# Patient Record
Sex: MALE | Race: WHITE | NOT HISPANIC OR LATINO | Employment: OTHER | URBAN - METROPOLITAN AREA
[De-identification: names, ages, dates, MRNs, and addresses within clinical notes are randomized per-mention and may not be internally consistent; named-entity substitution may affect disease eponyms.]

---

## 2023-08-16 LAB
BASOPHILS (10*3/UL) IN BLOOD BY AUTOMATED COUNT: 0.03 X10E9/L (ref 0–0.1)
BASOPHILS/100 LEUKOCYTES IN BLOOD BY AUTOMATED COUNT: 0.5 % (ref 0–2)
EOSINOPHILS (10*3/UL) IN BLOOD BY AUTOMATED COUNT: 0.09 X10E9/L (ref 0–0.7)
EOSINOPHILS/100 LEUKOCYTES IN BLOOD BY AUTOMATED COUNT: 1.5 % (ref 0–6)
ERYTHROCYTE DISTRIBUTION WIDTH (RATIO) BY AUTOMATED COUNT: 13.3 % (ref 11.5–14.5)
ERYTHROCYTE MEAN CORPUSCULAR HEMOGLOBIN CONCENTRATION (G/DL) BY AUTOMATED: 33.6 G/DL (ref 32–36)
ERYTHROCYTE MEAN CORPUSCULAR VOLUME (FL) BY AUTOMATED COUNT: 98 FL (ref 80–100)
ERYTHROCYTES (10*6/UL) IN BLOOD BY AUTOMATED COUNT: 5.09 X10E12/L (ref 4.5–5.9)
FERRITIN (UG/LL) IN SER/PLAS: 84 UG/L (ref 20–300)
HEMATOCRIT (%) IN BLOOD BY AUTOMATED COUNT: 49.7 % (ref 41–52)
HEMOGLOBIN (G/DL) IN BLOOD: 16.7 G/DL (ref 13.5–17.5)
IMMATURE GRANULOCYTES/100 LEUKOCYTES IN BLOOD BY AUTOMATED COUNT: 0.2 % (ref 0–0.9)
LEUKOCYTES (10*3/UL) IN BLOOD BY AUTOMATED COUNT: 5.8 X10E9/L (ref 4.4–11.3)
LYMPHOCYTES (10*3/UL) IN BLOOD BY AUTOMATED COUNT: 1.56 X10E9/L (ref 1.2–4.8)
LYMPHOCYTES/100 LEUKOCYTES IN BLOOD BY AUTOMATED COUNT: 26.9 % (ref 13–44)
MONOCYTES (10*3/UL) IN BLOOD BY AUTOMATED COUNT: 0.48 X10E9/L (ref 0.1–1)
MONOCYTES/100 LEUKOCYTES IN BLOOD BY AUTOMATED COUNT: 8.3 % (ref 2–10)
NEUTROPHILS (10*3/UL) IN BLOOD BY AUTOMATED COUNT: 3.64 X10E9/L (ref 1.2–7.7)
NEUTROPHILS/100 LEUKOCYTES IN BLOOD BY AUTOMATED COUNT: 62.6 % (ref 40–80)
PLATELETS (10*3/UL) IN BLOOD AUTOMATED COUNT: 196 X10E9/L (ref 150–450)

## 2023-09-12 PROBLEM — K74.60 CIRRHOSIS OF LIVER (MULTI): Status: ACTIVE | Noted: 2023-09-12

## 2023-09-12 PROBLEM — Z96.659 ARTIFICIAL KNEE JOINT PRESENT: Status: ACTIVE | Noted: 2023-09-12

## 2023-09-12 PROBLEM — I20.9 ANGINA PECTORIS (CMS-HCC): Status: ACTIVE | Noted: 2023-09-12

## 2023-09-12 PROBLEM — B19.20 HEPATITIS C VIRUS: Status: ACTIVE | Noted: 2023-09-12

## 2023-09-12 PROBLEM — E55.9 VITAMIN D DEFICIENCY: Status: ACTIVE | Noted: 2023-09-12

## 2023-09-12 PROBLEM — G47.00 INSOMNIA: Status: ACTIVE | Noted: 2023-09-12

## 2023-09-12 PROBLEM — N40.0 BENIGN PROSTATIC HYPERPLASIA: Status: ACTIVE | Noted: 2023-09-12

## 2023-09-12 PROBLEM — E83.119 HEMOCHROMATOSIS: Status: ACTIVE | Noted: 2023-09-12

## 2023-09-12 PROBLEM — E78.5 HYPERLIPIDEMIA: Status: ACTIVE | Noted: 2023-09-12

## 2023-09-12 PROBLEM — M17.9 OSTEOARTHRITIS OF KNEE: Status: ACTIVE | Noted: 2023-09-12

## 2023-09-12 PROBLEM — I10 ESSENTIAL HYPERTENSION: Status: ACTIVE | Noted: 2023-09-12

## 2023-09-12 PROBLEM — N52.9 ERECTILE DYSFUNCTION: Status: ACTIVE | Noted: 2023-09-12

## 2023-09-12 PROBLEM — I10 HIGH BLOOD PRESSURE: Status: ACTIVE | Noted: 2023-09-12

## 2023-09-12 PROBLEM — K42.9 UMBILICAL HERNIA: Status: ACTIVE | Noted: 2023-09-12

## 2023-09-12 PROBLEM — M19.90 OSTEOARTHRITIS: Status: ACTIVE | Noted: 2023-09-12

## 2023-09-12 RX ORDER — LISINOPRIL 20 MG/1
1 TABLET ORAL DAILY
COMMUNITY
Start: 2013-12-03 | End: 2024-02-02 | Stop reason: SDUPTHER

## 2023-09-12 RX ORDER — OXYCODONE AND ACETAMINOPHEN 5; 325 MG/1; MG/1
1 TABLET ORAL EVERY 4 HOURS PRN
COMMUNITY
End: 2023-10-16 | Stop reason: ALTCHOICE

## 2023-09-12 RX ORDER — BACLOFEN 10 MG/1
1 TABLET ORAL 3 TIMES DAILY PRN
COMMUNITY
End: 2023-10-16 | Stop reason: ALTCHOICE

## 2023-09-12 RX ORDER — POLYETHYLENE GLYCOL 3350 17 G/17G
POWDER, FOR SOLUTION ORAL DAILY PRN
COMMUNITY
End: 2023-10-16 | Stop reason: ALTCHOICE

## 2023-09-12 RX ORDER — CHOLECALCIFEROL (VITAMIN D3) 125 MCG
1 TABLET ORAL EVERY MORNING
COMMUNITY
Start: 2017-07-17 | End: 2023-10-16 | Stop reason: ALTCHOICE

## 2023-09-12 RX ORDER — LISINOPRIL 10 MG/1
1 TABLET ORAL DAILY
COMMUNITY
Start: 2014-12-16 | End: 2023-10-16 | Stop reason: ALTCHOICE

## 2023-09-12 RX ORDER — DOCUSATE SODIUM 100 MG/1
1 CAPSULE, LIQUID FILLED ORAL 2 TIMES DAILY
COMMUNITY
End: 2023-10-16 | Stop reason: ALTCHOICE

## 2023-10-13 DIAGNOSIS — E83.110 HEREDITARY HEMOCHROMATOSIS (CMS-HCC): Primary | ICD-10-CM

## 2023-10-13 RX ORDER — ALBUTEROL SULFATE 0.83 MG/ML
3 SOLUTION RESPIRATORY (INHALATION) AS NEEDED
Status: CANCELLED | OUTPATIENT
Start: 2023-10-16

## 2023-10-13 RX ORDER — DIPHENHYDRAMINE HYDROCHLORIDE 50 MG/ML
50 INJECTION INTRAMUSCULAR; INTRAVENOUS AS NEEDED
Status: CANCELLED | OUTPATIENT
Start: 2023-10-16

## 2023-10-13 RX ORDER — FAMOTIDINE 10 MG/ML
20 INJECTION INTRAVENOUS ONCE AS NEEDED
Status: CANCELLED | OUTPATIENT
Start: 2023-10-16

## 2023-10-13 RX ORDER — EPINEPHRINE 0.3 MG/.3ML
0.3 INJECTION SUBCUTANEOUS EVERY 5 MIN PRN
Status: CANCELLED | OUTPATIENT
Start: 2023-10-16

## 2023-10-16 ENCOUNTER — INFUSION (OUTPATIENT)
Dept: HEMATOLOGY/ONCOLOGY | Facility: CLINIC | Age: 68
End: 2023-10-16
Payer: MEDICARE

## 2023-10-16 ENCOUNTER — APPOINTMENT (OUTPATIENT)
Dept: LAB | Facility: CLINIC | Age: 68
End: 2023-10-16
Payer: MEDICARE

## 2023-10-16 VITALS
SYSTOLIC BLOOD PRESSURE: 132 MMHG | OXYGEN SATURATION: 97 % | BODY MASS INDEX: 30.02 KG/M2 | HEIGHT: 70 IN | HEART RATE: 74 BPM | TEMPERATURE: 97.9 F | RESPIRATION RATE: 18 BRPM | WEIGHT: 209.66 LBS | DIASTOLIC BLOOD PRESSURE: 86 MMHG

## 2023-10-16 DIAGNOSIS — E83.110 HEREDITARY HEMOCHROMATOSIS (CMS-HCC): ICD-10-CM

## 2023-10-16 LAB
ANION GAP SERPL CALC-SCNC: 13 MMOL/L (ref 10–20)
BASOPHILS # BLD AUTO: 0.05 X10*3/UL (ref 0–0.1)
BASOPHILS NFR BLD AUTO: 0.7 %
BUN SERPL-MCNC: 17 MG/DL (ref 6–23)
CALCIUM SERPL-MCNC: 9.9 MG/DL (ref 8.6–10.3)
CHLORIDE SERPL-SCNC: 103 MMOL/L (ref 98–107)
CO2 SERPL-SCNC: 27 MMOL/L (ref 21–32)
CREAT SERPL-MCNC: 1.13 MG/DL (ref 0.5–1.3)
EOSINOPHIL # BLD AUTO: 0.08 X10*3/UL (ref 0–0.7)
EOSINOPHIL NFR BLD AUTO: 1.1 %
ERYTHROCYTE [DISTWIDTH] IN BLOOD BY AUTOMATED COUNT: 12.9 % (ref 11.5–14.5)
FERRITIN SERPL-MCNC: 81 NG/ML (ref 20–300)
GFR SERPL CREATININE-BSD FRML MDRD: 71 ML/MIN/1.73M*2
GLUCOSE SERPL-MCNC: 80 MG/DL (ref 74–99)
HCT VFR BLD AUTO: 46.4 % (ref 41–52)
HGB BLD-MCNC: 16.2 G/DL (ref 13.5–17.5)
IMM GRANULOCYTES # BLD AUTO: 0.02 X10*3/UL (ref 0–0.7)
IMM GRANULOCYTES NFR BLD AUTO: 0.3 % (ref 0–0.9)
LYMPHOCYTES # BLD AUTO: 2.33 X10*3/UL (ref 1.2–4.8)
LYMPHOCYTES NFR BLD AUTO: 30.6 %
MCH RBC QN AUTO: 32.7 PG (ref 26–34)
MCHC RBC AUTO-ENTMCNC: 34.9 G/DL (ref 32–36)
MCV RBC AUTO: 94 FL (ref 80–100)
MONOCYTES # BLD AUTO: 0.49 X10*3/UL (ref 0.1–1)
MONOCYTES NFR BLD AUTO: 6.4 %
NEUTROPHILS # BLD AUTO: 4.64 X10*3/UL (ref 1.2–7.7)
NEUTROPHILS NFR BLD AUTO: 60.9 %
NRBC BLD-RTO: 0 /100 WBCS (ref 0–0)
PLATELET # BLD AUTO: 174 X10*3/UL (ref 150–450)
PMV BLD AUTO: 11.2 FL (ref 7.5–11.5)
POTASSIUM SERPL-SCNC: 4.3 MMOL/L (ref 3.5–5.3)
RBC # BLD AUTO: 4.96 X10*6/UL (ref 4.5–5.9)
SODIUM SERPL-SCNC: 139 MMOL/L (ref 136–145)
WBC # BLD AUTO: 7.6 X10*3/UL (ref 4.4–11.3)

## 2023-10-16 PROCEDURE — 84460 ALANINE AMINO (ALT) (SGPT): CPT | Mod: CMCLAB | Performed by: NURSE PRACTITIONER

## 2023-10-16 PROCEDURE — 99195 PHLEBOTOMY: CPT

## 2023-10-16 PROCEDURE — 36415 COLL VENOUS BLD VENIPUNCTURE: CPT

## 2023-10-16 PROCEDURE — 85025 COMPLETE CBC W/AUTO DIFF WBC: CPT | Performed by: NURSE PRACTITIONER

## 2023-10-16 PROCEDURE — 83540 ASSAY OF IRON: CPT | Performed by: NURSE PRACTITIONER

## 2023-10-16 PROCEDURE — 80053 COMPREHEN METABOLIC PANEL: CPT | Performed by: NURSE PRACTITIONER

## 2023-10-16 PROCEDURE — 82728 ASSAY OF FERRITIN: CPT | Mod: CMCLAB | Performed by: NURSE PRACTITIONER

## 2023-10-16 ASSESSMENT — PATIENT HEALTH QUESTIONNAIRE - PHQ9
2. FEELING DOWN, DEPRESSED OR HOPELESS: NOT AT ALL
1. LITTLE INTEREST OR PLEASURE IN DOING THINGS: NOT AT ALL
SUM OF ALL RESPONSES TO PHQ9 QUESTIONS 1 AND 2: 0

## 2023-10-16 ASSESSMENT — ENCOUNTER SYMPTOMS
DEPRESSION: 0
LOSS OF SENSATION IN FEET: 0
OCCASIONAL FEELINGS OF UNSTEADINESS: 0

## 2023-10-16 ASSESSMENT — COLUMBIA-SUICIDE SEVERITY RATING SCALE - C-SSRS
1. IN THE PAST MONTH, HAVE YOU WISHED YOU WERE DEAD OR WISHED YOU COULD GO TO SLEEP AND NOT WAKE UP?: NO
2. HAVE YOU ACTUALLY HAD ANY THOUGHTS OF KILLING YOURSELF?: NO
6. HAVE YOU EVER DONE ANYTHING, STARTED TO DO ANYTHING, OR PREPARED TO DO ANYTHING TO END YOUR LIFE?: NO

## 2023-10-16 ASSESSMENT — PAIN SCALES - GENERAL: PAINLEVEL: 0-NO PAIN

## 2023-10-17 LAB
ALBUMIN SERPL BCP-MCNC: 5.2 G/DL (ref 3.4–5)
ALP SERPL-CCNC: 73 U/L (ref 33–136)
ALT SERPL W P-5'-P-CCNC: 39 U/L (ref 10–52)
AST SERPL W P-5'-P-CCNC: 35 U/L (ref 9–39)
BILIRUB DIRECT SERPL-MCNC: 0.1 MG/DL (ref 0–0.3)
BILIRUB SERPL-MCNC: 1.1 MG/DL (ref 0–1.2)
IRON SATN MFR SERPL: 42 % (ref 25–45)
IRON SERPL-MCNC: 197 UG/DL (ref 35–150)
PROT SERPL-MCNC: 8 G/DL (ref 6.4–8.2)
TIBC SERPL-MCNC: 473 UG/DL (ref 240–445)
UIBC SERPL-MCNC: 276 UG/DL (ref 110–370)

## 2023-11-24 ENCOUNTER — HOSPITAL ENCOUNTER (OUTPATIENT)
Dept: RADIOLOGY | Facility: HOSPITAL | Age: 68
End: 2023-11-24
Payer: MEDICARE

## 2023-11-26 ENCOUNTER — HOSPITAL ENCOUNTER (OUTPATIENT)
Dept: RADIOLOGY | Facility: HOSPITAL | Age: 68
Discharge: HOME | End: 2023-11-26
Payer: MEDICARE

## 2023-11-26 DIAGNOSIS — M25.572 PAIN IN LEFT ANKLE AND JOINTS OF LEFT FOOT: ICD-10-CM

## 2023-11-26 DIAGNOSIS — G89.29 OTHER CHRONIC PAIN: ICD-10-CM

## 2023-11-26 PROCEDURE — 73721 MRI JNT OF LWR EXTRE W/O DYE: CPT | Mod: LT

## 2023-11-27 ENCOUNTER — APPOINTMENT (OUTPATIENT)
Dept: CARDIOLOGY | Facility: CLINIC | Age: 68
End: 2023-11-27
Payer: MEDICARE

## 2023-12-07 RX ORDER — HEPARIN SODIUM,PORCINE/PF 10 UNIT/ML
50 SYRINGE (ML) INTRAVENOUS AS NEEDED
Status: CANCELLED | OUTPATIENT
Start: 2023-12-07

## 2023-12-11 ENCOUNTER — OFFICE VISIT (OUTPATIENT)
Dept: HEMATOLOGY/ONCOLOGY | Facility: CLINIC | Age: 68
End: 2023-12-11
Payer: MEDICARE

## 2023-12-11 ENCOUNTER — LAB (OUTPATIENT)
Dept: LAB | Facility: CLINIC | Age: 68
End: 2023-12-11
Payer: MEDICARE

## 2023-12-11 ENCOUNTER — INFUSION (OUTPATIENT)
Dept: HEMATOLOGY/ONCOLOGY | Facility: CLINIC | Age: 68
End: 2023-12-11
Payer: MEDICARE

## 2023-12-11 VITALS
WEIGHT: 211.31 LBS | HEART RATE: 74 BPM | BODY MASS INDEX: 30.15 KG/M2 | SYSTOLIC BLOOD PRESSURE: 130 MMHG | OXYGEN SATURATION: 95 % | TEMPERATURE: 98.4 F | DIASTOLIC BLOOD PRESSURE: 79 MMHG

## 2023-12-11 VITALS — HEART RATE: 69 BPM | SYSTOLIC BLOOD PRESSURE: 131 MMHG | DIASTOLIC BLOOD PRESSURE: 81 MMHG

## 2023-12-11 DIAGNOSIS — E83.110 HEREDITARY HEMOCHROMATOSIS (CMS-HCC): ICD-10-CM

## 2023-12-11 DIAGNOSIS — E83.110 HEREDITARY HEMOCHROMATOSIS (CMS-HCC): Primary | ICD-10-CM

## 2023-12-11 LAB
BASOPHILS # BLD AUTO: 0.04 X10*3/UL (ref 0–0.1)
BASOPHILS NFR BLD AUTO: 0.6 %
EOSINOPHIL # BLD AUTO: 0.07 X10*3/UL (ref 0–0.7)
EOSINOPHIL NFR BLD AUTO: 1.1 %
ERYTHROCYTE [DISTWIDTH] IN BLOOD BY AUTOMATED COUNT: 13.1 % (ref 11.5–14.5)
FERRITIN SERPL-MCNC: 60 NG/ML (ref 20–300)
HCT VFR BLD AUTO: 46.6 % (ref 41–52)
HGB BLD-MCNC: 15.9 G/DL (ref 13.5–17.5)
IMM GRANULOCYTES # BLD AUTO: 0.01 X10*3/UL (ref 0–0.7)
IMM GRANULOCYTES NFR BLD AUTO: 0.2 % (ref 0–0.9)
LYMPHOCYTES # BLD AUTO: 1.96 X10*3/UL (ref 1.2–4.8)
LYMPHOCYTES NFR BLD AUTO: 30.2 %
MCH RBC QN AUTO: 32.3 PG (ref 26–34)
MCHC RBC AUTO-ENTMCNC: 34.1 G/DL (ref 32–36)
MCV RBC AUTO: 95 FL (ref 80–100)
MONOCYTES # BLD AUTO: 0.49 X10*3/UL (ref 0.1–1)
MONOCYTES NFR BLD AUTO: 7.6 %
NEUTROPHILS # BLD AUTO: 3.91 X10*3/UL (ref 1.2–7.7)
NEUTROPHILS NFR BLD AUTO: 60.3 %
NRBC BLD-RTO: 0 /100 WBCS (ref 0–0)
PLATELET # BLD AUTO: 183 X10*3/UL (ref 150–450)
RBC # BLD AUTO: 4.93 X10*6/UL (ref 4.5–5.9)
WBC # BLD AUTO: 6.5 X10*3/UL (ref 4.4–11.3)

## 2023-12-11 PROCEDURE — 1036F TOBACCO NON-USER: CPT | Performed by: NURSE PRACTITIONER

## 2023-12-11 PROCEDURE — 3078F DIAST BP <80 MM HG: CPT | Performed by: NURSE PRACTITIONER

## 2023-12-11 PROCEDURE — 82728 ASSAY OF FERRITIN: CPT | Performed by: NURSE PRACTITIONER

## 2023-12-11 PROCEDURE — 36415 COLL VENOUS BLD VENIPUNCTURE: CPT | Performed by: NURSE PRACTITIONER

## 2023-12-11 PROCEDURE — 1126F AMNT PAIN NOTED NONE PRSNT: CPT | Performed by: NURSE PRACTITIONER

## 2023-12-11 PROCEDURE — 1159F MED LIST DOCD IN RCRD: CPT | Performed by: NURSE PRACTITIONER

## 2023-12-11 PROCEDURE — 99195 PHLEBOTOMY: CPT

## 2023-12-11 PROCEDURE — 85025 COMPLETE CBC W/AUTO DIFF WBC: CPT

## 2023-12-11 PROCEDURE — 99213 OFFICE O/P EST LOW 20 MIN: CPT | Performed by: NURSE PRACTITIONER

## 2023-12-11 PROCEDURE — 3075F SYST BP GE 130 - 139MM HG: CPT | Performed by: NURSE PRACTITIONER

## 2023-12-11 RX ORDER — ALBUTEROL SULFATE 0.83 MG/ML
3 SOLUTION RESPIRATORY (INHALATION) AS NEEDED
Status: CANCELLED | OUTPATIENT
Start: 2024-02-05

## 2023-12-11 RX ORDER — DIPHENHYDRAMINE HYDROCHLORIDE 50 MG/ML
50 INJECTION INTRAMUSCULAR; INTRAVENOUS AS NEEDED
Status: CANCELLED | OUTPATIENT
Start: 2024-02-05

## 2023-12-11 RX ORDER — EPINEPHRINE 0.3 MG/.3ML
0.3 INJECTION SUBCUTANEOUS EVERY 5 MIN PRN
Status: CANCELLED | OUTPATIENT
Start: 2024-02-05

## 2023-12-11 RX ORDER — FAMOTIDINE 10 MG/ML
20 INJECTION INTRAVENOUS ONCE AS NEEDED
Status: CANCELLED | OUTPATIENT
Start: 2024-02-05

## 2023-12-11 ASSESSMENT — PAIN SCALES - GENERAL: PAINLEVEL: 0-NO PAIN

## 2023-12-11 ASSESSMENT — ENCOUNTER SYMPTOMS
DEPRESSION: 0
LOSS OF SENSATION IN FEET: 0
OCCASIONAL FEELINGS OF UNSTEADINESS: 0

## 2023-12-11 NOTE — PROGRESS NOTES
Patient ID: Juan Miller is a 68 y.o. male.    Interval History:  The patient comes in today for routine annual followup visit for history of hemochromatosis, compound heterozygosity for genes, C282Y and H63D. He continues with phlebotomies every two months. Following with Dr. French in GI last ultrasound of the liver completed in 2022.   On presentation today he denies any fevers, chills or night sweats. No cough, chest pain or shortness of breath. No nausea or vomiting. No diarrhea or constipation. No interval infections or hospitalizations.  He tolerates his phlebotomies without significant side effects.     Has met with Dr. France for planter fasciitis and is scheduled for surgery on 2023.  He is also following with vascular for varicose vein injections.    Past Medical History:  Compound heterozygosity for hemochromatosis gene, C282Y and H63D, cirrhosis of the liver, chronic hepatitis C infection, genotype II in relapse after initial remission after interferon therapy, kidney stones, status post treatment for his hepatitis C infection in the spring of 2017, status post shingles in the T3 dermatome.    Past Surgical History:  knee surgeries x3, one skin grafting, facial bone grafting, liver biopsies, status post lithotripsy with ureteral stent placement in 2019 through 2020 with four separate procedures.    Allergies:   Oxycodone    Mediations:  Vitamin D3 and lisinopril     Vital Signs:  /79 (BP Location: Left arm, Patient Position: Sitting, BP Cuff Size: Adult)   Pulse 74   Temp 36.9 °C (98.4 °F) (Temporal)   Wt 95.8 kg (211 lb 5 oz)   SpO2 95%   BMI 30.15 kg/m²     Physical Exam:  ECO  Pain: 0  Constitutional: Well developed, awake/alert/oriented x3, no distress, alert and cooperative  Eyes: PER. sclera anicteric  ENMT: Oral mucosa moist  Respiratory/Thorax: Breathing is non-labored. Lungs are clear to auscultation bilaterally. No adventitious breath  sounds  Cardiovascular: S1-S2. Regular rate and rhythm. No murmurs, rubs, or gallops appreciated  Gastrointestinal: Abdomen soft nontender, nondistended, normal active bowel sounds.   Musculoskeletal: ROM intact, no joint swelling, normal strength  Extremities: normal extremities, no cyanosis, no edema, no clubbing  Neurologic: alert and oriented x3. Nonfocal exam. No myoclonus  Psychological: Pleasant, appropriate and easily engaged     Lab Results:  October 16, 2023  Ferritin 81    December 11, 2023  WBC 6.5, hemoglobin 15.9, hematocrit 46.6, platelet 183,000    Assessment:  This is a 68-year-old gentleman with a compound heterozygosity for the hemochromatosis HFE gene locus with cirrhosis of the liver likely secondary to hemochromatosis, alcohol and hepatitis C, currently well-controlled with phlebotomies.    1. Hemochromatosis: The patient continues with phlebotomies once every eight weeks.  Goal Ferritin <50 and HGB >11.5  2. Liver: His liver ultrasounds are due every three years; next due in November 2025.  Follows with Dr. French  3. Hepatitis C: This remains in complete remission.    Plan:  - Provider visit annually   - phlebotomy every 2 months  - labs  - - CBCd and ferritin each phlebotomy  - - CMP, iron panel every 6 months (December and June)    DELIA Cutler-CNP

## 2023-12-12 ENCOUNTER — PRE-ADMISSION TESTING (OUTPATIENT)
Dept: PREADMISSION TESTING | Facility: HOSPITAL | Age: 68
End: 2023-12-12
Payer: MEDICARE

## 2023-12-12 VITALS
WEIGHT: 211.64 LBS | TEMPERATURE: 96.3 F | RESPIRATION RATE: 16 BRPM | HEART RATE: 69 BPM | BODY MASS INDEX: 30.3 KG/M2 | SYSTOLIC BLOOD PRESSURE: 120 MMHG | DIASTOLIC BLOOD PRESSURE: 79 MMHG | OXYGEN SATURATION: 97 % | HEIGHT: 70 IN

## 2023-12-12 PROCEDURE — 99204 OFFICE O/P NEW MOD 45 MIN: CPT | Performed by: NURSE PRACTITIONER

## 2023-12-12 RX ORDER — IBUPROFEN 200 MG
400 TABLET ORAL EVERY 6 HOURS PRN
COMMUNITY

## 2023-12-12 RX ORDER — ZINC SULFATE 50(220)MG
50 CAPSULE ORAL DAILY PRN
COMMUNITY
End: 2024-05-08 | Stop reason: WASHOUT

## 2023-12-12 RX ORDER — ASCORBIC ACID 500 MG
500 TABLET ORAL DAILY PRN
COMMUNITY

## 2023-12-12 ASSESSMENT — PAIN SCALES - GENERAL: PAINLEVEL_OUTOF10: 7

## 2023-12-12 ASSESSMENT — CHADS2 SCORE
CHADS2 SCORE: 1
AGE GREATER THAN OR EQUAL TO 75: NO
DIABETES: NO
HYPERTENSION: YES
CHF: NO
PRIOR STROKE OR TIA OR THROMBOEMBOLISM: NO

## 2023-12-12 ASSESSMENT — PAIN DESCRIPTION - DESCRIPTORS: DESCRIPTORS: SHARP

## 2023-12-12 ASSESSMENT — ENCOUNTER SYMPTOMS
ARTHRALGIAS: 1
ACTIVITY CHANGE: 1
EYES NEGATIVE: 1
RESPIRATORY NEGATIVE: 1
CARDIOVASCULAR NEGATIVE: 1
NEUROLOGICAL NEGATIVE: 1
GASTROINTESTINAL NEGATIVE: 1
PSYCHIATRIC NEGATIVE: 1
ENDOCRINE NEGATIVE: 1

## 2023-12-12 ASSESSMENT — PAIN - FUNCTIONAL ASSESSMENT: PAIN_FUNCTIONAL_ASSESSMENT: 0-10

## 2023-12-12 ASSESSMENT — DUKE ACTIVITY SCORE INDEX (DASI)
CAN YOU DO LIGHT WORK AROUND THE HOUSE LIKE DUSTING OR WASHING DISHES: YES
CAN YOU WALK INDOORS, SUCH AS AROUND YOUR HOUSE: YES
CAN YOU WALK A BLOCK OR TWO ON LEVEL GROUND: YES
DASI METS SCORE: 7
CAN YOU PARTICIPATE IN STRENOUS SPORTS LIKE SWIMMING, SINGLES TENNIS, FOOTBALL, BASKETBALL, OR SKIING: NO
CAN YOU RUN A SHORT DISTANCE: NO
TOTAL_SCORE: 34.7
CAN YOU DO MODERATE WORK AROUND THE HOUSE LIKE VACUUMING, SWEEPING FLOORS OR CARRYING GROCERIES: YES
CAN YOU DO HEAVY WORK AROUND THE HOUSE LIKE SCRUBBING FLOORS OR LIFTING AND MOVING HEAVY FURNITURE: NO
CAN YOU HAVE SEXUAL RELATIONS: YES
CAN YOU PARTICIPATE IN MODERATE RECREATIONAL ACTIVITIES LIKE GOLF, BOWLING, DANCING, DOUBLES TENNIS OR THROWING A BASEBALL OR FOOTBALL: YES
CAN YOU CLIMB A FLIGHT OF STAIRS OR WALK UP A HILL: YES
CAN YOU TAKE CARE OF YOURSELF (EAT, DRESS, BATHE, OR USE TOILET): YES
CAN YOU DO YARD WORK LIKE RAKING LEAVES, WEEDING OR PUSHING A MOWER: YES

## 2023-12-12 NOTE — PREPROCEDURE INSTRUCTIONS
Medication List            Accurate as of December 12, 2023  6:57 AM. Always use your most recent med list.                ascorbic acid 500 mg tablet  Commonly known as: Vitamin C  Medication Adjustments for Surgery: Stop 7 days before surgery     ibuprofen 200 mg tablet  Medication Adjustments for Surgery: Stop 7 days before surgery     lisinopril 20 mg tablet  Medication Adjustments for Surgery: Stop 1 day before surgery     VITAMIN D3 ORAL  Medication Adjustments for Surgery: Stop 7 days before surgery     zinc sulfate 220 (50 Zn) MG capsule  Commonly known as: Zincate  Medication Adjustments for Surgery: Stop 7 days before surgery                PAT DISCHARGE INSTRUCTIONS    Please call the Same Day Surgery (SDS) Department of the hospital where your procedure will be performed after 2:00 PM the day before your surgery. If you are scheduled on a Monday, or a Tuesday following a Monday holiday, you will need to call on the last business day prior to your surgery.    [facility]  [address] [place] [address], [address] [phone]    [facility]  [address] [place] [address] [phone]    [facility]  [address] [facility].  [place] [id number] [phone]    Please let your surgeon know if:      You develop any open sores, shingles, burning or painful urination as these may increase your risk of an infection.   You no longer wish to have the surgery.   Any other personal circumstances change that may lead to the need to cancel or defer this surgery-such as being sick or getting admitted to any hospital within one week of your planned procedure.    Your contact details change, such as a change of address or phone number.    Starting now:     Please DO NOT drink alcohol or smoke for 24 hours before surgery. It is well known that quitting smoking can make a huge difference to your health and recovery  from surgery. The longer you abstain from smoking, the better your chances of a healthy recovery. If you need help with quitting, call 9-800QUIT-NOW to be connected to a trained counselor who will discuss the best methods to help you quit.     Before your surgery:    Please stop all supplements 7 days prior to surgery. Or as directed by your surgeon.   Please stop taking NSAID pain medicine such as Advil and Motrin 7 days before surgery.    If you develop any fever, cough, cold, rashes, cuts, scratches, scrapes, urinary symptoms or infection anywhere on your body (including teeth and gums) prior to surgery, please call your surgeon’s office as soon as possible. This may require treatment to reduce the chance of cancellation on the day of surgery.    The day before your surgery:   DIET- Do not eat any food after MIDNIGHT. May have 10 ounces of CLEAR LIQUIDS until TWO HOURS before your arrival time. This includes water, black tea or coffee (no milk ir cream), apple juice and electrolyte drinks (Gatorade). May chew gum until TWO hours before your surgery time.   Get a good night’s rest.  Use the special soap for bathing if you have been instructed to use one.    Scheduled surgery times may change and you will be notified if this occurs - please check your personal voicemail for any updates.     On the morning of surgery:   Wear comfortable, loose fitting clothes which open in the front. Please do not wear moisturizers, creams, lotions, makeup or perfume.    Please bring with you to surgery:   Photo ID and insurance card   Current list of medicines and allergies   Pacemaker/ Defibrillator/Heart stent cards   CPAP machine and mask    Slings/ splints/ crutches   A copy of your complete advanced directive/DHPOA.    Please do NOT bring with you to surgery:   All jewelry and valuables should be left at home.   Prosthetic devices such as contact lenses, hearing aids, dentures, eyelash extensions, hairpins and body piercings  must be removed prior to going in to the surgical suite.    After outpatient surgery:   A responsible adult MUST accompany you at the time of discharge and stay with you for 24 hours after your surgery. You may NOT drive yourself home after surgery.    Do not drive, operate machinery, make critical decisions or do activities that require co-ordination or balance until after a night’s sleep.   Do not drink alcoholic beverages for 24 hours.   Instructions for resuming your medications will be provided by your surgeon.    CALL YOUR DOCTOR AFTER SURGERY IF YOU HAVE:     Chills and/or a fever of 101° F or higher.    Redness, swelling, pus or drainage from your surgical wound or a bad smell from the wound.    Lightheadedness, fainting or confusion.    Persistent vomiting (throwing up) and are not able to eat or drink for 12 hours.    Three or more loose, watery bowel movements in 24 hours (diarrhea).   Difficulty or pain while urinating( after non-urological surgery)    Pain and swelling in your legs, especially if it is only on one side.    Difficulty breathing or are breathing faster than normal.    Any new concerning symptoms.

## 2023-12-12 NOTE — CPM/PAT H&P
CPM/PAT Evaluation       Name: Juan Miller (Juan Miller)  /Age: 1955/68 y.o.     In-Person       Chief Complaint: Plantar fascial fibromatosis, Left foot pain     HPI  A 68-year-old male with plantar fascial fibromatosis, left foot pain.  History of progressive left foot/heel pain over the past year.  Symptoms increase with initially standing in the morning, prolonged standing, ambulation, and after physical activities interfering with ADLs.  Conservative treatments/ injections not helping.  Denies fever, chills, chest pain, shortness of breath, syncope, MI, CVA, PE, DVT.  He is scheduled for endoscopic plantar fasciectomy left foot.    Past Medical History:   Diagnosis Date    BPH (benign prostatic hyperplasia)     Chronic hepatitis C (CMS/HCC)     Hemochromatosis     HTN (hypertension)     Hyperlipidemia     Kidney stones     Liver cirrhosis (CMS/HCC)     Osteoarthritis        Past Surgical History:   Procedure Laterality Date    COLONOSCOPY  2016    Complete Colonoscopy    ESOPHAGOGASTRODUODENOSCOPY  2016    Diagnostic Esophagogastroduodenoscopy    HERNIA REPAIR  2016    Hernia Repair    KNEE SURGERY  2016    Knee Surgery    LITHOTRIPSY      ureteral stent placement    OTHER SURGICAL HISTORY  2016    Biopsy Skin         Allergies   Allergen Reactions    Oxycodone Shortness of breath       Current Outpatient Medications   Medication Sig Dispense Refill    ascorbic acid (Vitamin C) 500 mg tablet Take 1 tablet (500 mg) by mouth once daily as needed.      cholecalciferol, vitamin D3, (VITAMIN D3 ORAL) Take 1 capsule by mouth once daily.      ibuprofen 200 mg tablet Take 2 tablets (400 mg) by mouth every 6 hours if needed for mild pain (1 - 3).      lisinopril 20 mg tablet Take 1 tablet (20 mg) by mouth once daily.      zinc sulfate (Zincate) 220 (50 Zn) MG capsule Take 1 capsule (50 mg of elemental zinc) by mouth once daily as needed.       No current  "facility-administered medications for this visit.     Review of Systems   Constitutional:  Positive for activity change.   HENT: Negative.          Reports chronic sinus drainage/morning cough   Eyes: Negative.         Glasses   Respiratory: Negative.     Cardiovascular: Negative.    Gastrointestinal: Negative.    Endocrine: Negative.    Genitourinary: Negative.    Musculoskeletal:  Positive for arthralgias.        Chronic left foot/heel pain   Skin: Negative.    Neurological: Negative.    Psychiatric/Behavioral: Negative.          Physical Exam  Vitals reviewed.   HENT:      Head: Normocephalic and atraumatic.      Mouth/Throat:      Mouth: Mucous membranes are moist.      Comments: Upper partial  Eyes:      Pupils: Pupils are equal, round, and reactive to light.      Comments: Glasses   Cardiovascular:      Rate and Rhythm: Normal rate and regular rhythm.      Heart sounds: Normal heart sounds.   Pulmonary:      Effort: Pulmonary effort is normal.      Breath sounds: Normal breath sounds.   Abdominal:      Palpations: Abdomen is soft.   Musculoskeletal:         General: Normal range of motion.      Cervical back: Normal range of motion.      Comments: Left foot/heel pain, antalgic gait   Skin:     General: Skin is warm and dry.   Neurological:      Mental Status: He is alert and oriented to person, place, and time.   Psychiatric:         Mood and Affect: Mood normal.          PAT AIRWAY:   Airway:     Mallampati::  III    Neck ROM::  Full  normal        /79   Pulse 69   Temp 35.7 °C (96.3 °F) (Temporal)   Resp 16   Ht 1.778 m (5' 10\")   Wt 96 kg (211 lb 10.3 oz)   SpO2 97%   BMI 30.37 kg/m²         Stop Bang Score 5     CHADS 2 score: 2.8%  DASI score: 34.7  METS score: 7  Revised cardiac risk index: 0.4%  ASA II  ARISCAT 1.6%  Labs done 10/16/2023    Assessment and Plan:     Plantar fascial fibromatosis, Left foot pain Plan: Endoscopic plantar fasciectomy left " foot  Hypertension  Hyperlipidemia  BPH  Osteoarthritis  Liver cirrhosis/chronic hepatitis C in remission  Hemochromatosis-phlebotomy every 2 months-follows with oncology

## 2023-12-18 ENCOUNTER — ANESTHESIA EVENT (OUTPATIENT)
Dept: OPERATING ROOM | Facility: HOSPITAL | Age: 68
End: 2023-12-18
Payer: MEDICARE

## 2023-12-18 ENCOUNTER — HOSPITAL ENCOUNTER (OUTPATIENT)
Facility: HOSPITAL | Age: 68
Setting detail: OUTPATIENT SURGERY
Discharge: HOME | End: 2023-12-18
Attending: STUDENT IN AN ORGANIZED HEALTH CARE EDUCATION/TRAINING PROGRAM | Admitting: STUDENT IN AN ORGANIZED HEALTH CARE EDUCATION/TRAINING PROGRAM
Payer: MEDICARE

## 2023-12-18 ENCOUNTER — ANESTHESIA (OUTPATIENT)
Dept: OPERATING ROOM | Facility: HOSPITAL | Age: 68
End: 2023-12-18
Payer: MEDICARE

## 2023-12-18 ENCOUNTER — PHARMACY VISIT (OUTPATIENT)
Dept: PHARMACY | Facility: CLINIC | Age: 68
End: 2023-12-18
Payer: MEDICARE

## 2023-12-18 VITALS
BODY MASS INDEX: 30.06 KG/M2 | SYSTOLIC BLOOD PRESSURE: 156 MMHG | OXYGEN SATURATION: 100 % | HEART RATE: 63 BPM | TEMPERATURE: 97.3 F | WEIGHT: 210 LBS | RESPIRATION RATE: 15 BRPM | DIASTOLIC BLOOD PRESSURE: 85 MMHG | HEIGHT: 70 IN

## 2023-12-18 DIAGNOSIS — G89.18 POST-OPERATIVE PAIN: Primary | ICD-10-CM

## 2023-12-18 LAB
ABO GROUP (TYPE) IN BLOOD: NORMAL
ANTIBODY SCREEN: NORMAL
RH FACTOR (ANTIGEN D): NORMAL

## 2023-12-18 PROCEDURE — 7100000009 HC PHASE TWO TIME - INITIAL BASE CHARGE: Performed by: STUDENT IN AN ORGANIZED HEALTH CARE EDUCATION/TRAINING PROGRAM

## 2023-12-18 PROCEDURE — A28008 PR INCISION OF FOOT/TOE FASCIA: Performed by: ANESTHESIOLOGIST ASSISTANT

## 2023-12-18 PROCEDURE — 2500000004 HC RX 250 GENERAL PHARMACY W/ HCPCS (ALT 636 FOR OP/ED): Performed by: ANESTHESIOLOGIST ASSISTANT

## 2023-12-18 PROCEDURE — 94760 N-INVAS EAR/PLS OXIMETRY 1: CPT

## 2023-12-18 PROCEDURE — 2500000004 HC RX 250 GENERAL PHARMACY W/ HCPCS (ALT 636 FOR OP/ED): Performed by: STUDENT IN AN ORGANIZED HEALTH CARE EDUCATION/TRAINING PROGRAM

## 2023-12-18 PROCEDURE — 2720000007 HC OR 272 NO HCPCS: Performed by: STUDENT IN AN ORGANIZED HEALTH CARE EDUCATION/TRAINING PROGRAM

## 2023-12-18 PROCEDURE — 86850 RBC ANTIBODY SCREEN: CPT | Performed by: STUDENT IN AN ORGANIZED HEALTH CARE EDUCATION/TRAINING PROGRAM

## 2023-12-18 PROCEDURE — 36415 COLL VENOUS BLD VENIPUNCTURE: CPT | Performed by: STUDENT IN AN ORGANIZED HEALTH CARE EDUCATION/TRAINING PROGRAM

## 2023-12-18 PROCEDURE — 2500000005 HC RX 250 GENERAL PHARMACY W/O HCPCS: Performed by: STUDENT IN AN ORGANIZED HEALTH CARE EDUCATION/TRAINING PROGRAM

## 2023-12-18 PROCEDURE — RXMED WILLOW AMBULATORY MEDICATION CHARGE

## 2023-12-18 PROCEDURE — 3600000003 HC OR TIME - INITIAL BASE CHARGE - PROCEDURE LEVEL THREE: Performed by: STUDENT IN AN ORGANIZED HEALTH CARE EDUCATION/TRAINING PROGRAM

## 2023-12-18 PROCEDURE — A28008 PR INCISION OF FOOT/TOE FASCIA: Performed by: ANESTHESIOLOGY

## 2023-12-18 PROCEDURE — 3700000002 HC GENERAL ANESTHESIA TIME - EACH INCREMENTAL 1 MINUTE: Performed by: STUDENT IN AN ORGANIZED HEALTH CARE EDUCATION/TRAINING PROGRAM

## 2023-12-18 PROCEDURE — 3700000001 HC GENERAL ANESTHESIA TIME - INITIAL BASE CHARGE: Performed by: STUDENT IN AN ORGANIZED HEALTH CARE EDUCATION/TRAINING PROGRAM

## 2023-12-18 PROCEDURE — 7100000002 HC RECOVERY ROOM TIME - EACH INCREMENTAL 1 MINUTE: Performed by: STUDENT IN AN ORGANIZED HEALTH CARE EDUCATION/TRAINING PROGRAM

## 2023-12-18 PROCEDURE — 7100000010 HC PHASE TWO TIME - EACH INCREMENTAL 1 MINUTE: Performed by: STUDENT IN AN ORGANIZED HEALTH CARE EDUCATION/TRAINING PROGRAM

## 2023-12-18 PROCEDURE — 7100000001 HC RECOVERY ROOM TIME - INITIAL BASE CHARGE: Performed by: STUDENT IN AN ORGANIZED HEALTH CARE EDUCATION/TRAINING PROGRAM

## 2023-12-18 PROCEDURE — 3600000008 HC OR TIME - EACH INCREMENTAL 1 MINUTE - PROCEDURE LEVEL THREE: Performed by: STUDENT IN AN ORGANIZED HEALTH CARE EDUCATION/TRAINING PROGRAM

## 2023-12-18 RX ORDER — CEFAZOLIN SODIUM 2 G/100ML
2 INJECTION, SOLUTION INTRAVENOUS ONCE
Status: COMPLETED | OUTPATIENT
Start: 2023-12-18 | End: 2023-12-18

## 2023-12-18 RX ORDER — ONDANSETRON HYDROCHLORIDE 2 MG/ML
INJECTION, SOLUTION INTRAVENOUS AS NEEDED
Status: DISCONTINUED | OUTPATIENT
Start: 2023-12-18 | End: 2023-12-18

## 2023-12-18 RX ORDER — SODIUM CHLORIDE, SODIUM LACTATE, POTASSIUM CHLORIDE, CALCIUM CHLORIDE 600; 310; 30; 20 MG/100ML; MG/100ML; MG/100ML; MG/100ML
100 INJECTION, SOLUTION INTRAVENOUS CONTINUOUS
Status: DISCONTINUED | OUTPATIENT
Start: 2023-12-18 | End: 2023-12-18 | Stop reason: HOSPADM

## 2023-12-18 RX ORDER — HYDROCODONE BITARTRATE AND ACETAMINOPHEN 5; 325 MG/1; MG/1
1 TABLET ORAL EVERY 6 HOURS PRN
Qty: 28 TABLET | Refills: 0 | Status: SHIPPED | OUTPATIENT
Start: 2023-12-18 | End: 2023-12-25

## 2023-12-18 RX ORDER — LIDOCAINE HYDROCHLORIDE 10 MG/ML
INJECTION, SOLUTION EPIDURAL; INFILTRATION; INTRACAUDAL; PERINEURAL AS NEEDED
Status: DISCONTINUED | OUTPATIENT
Start: 2023-12-18 | End: 2023-12-18

## 2023-12-18 RX ORDER — ONDANSETRON HYDROCHLORIDE 2 MG/ML
4 INJECTION, SOLUTION INTRAVENOUS ONCE AS NEEDED
Status: DISCONTINUED | OUTPATIENT
Start: 2023-12-18 | End: 2023-12-18 | Stop reason: HOSPADM

## 2023-12-18 RX ORDER — MIDAZOLAM HYDROCHLORIDE 1 MG/ML
INJECTION, SOLUTION INTRAMUSCULAR; INTRAVENOUS AS NEEDED
Status: DISCONTINUED | OUTPATIENT
Start: 2023-12-18 | End: 2023-12-18

## 2023-12-18 RX ORDER — FENTANYL CITRATE 50 UG/ML
INJECTION, SOLUTION INTRAMUSCULAR; INTRAVENOUS AS NEEDED
Status: DISCONTINUED | OUTPATIENT
Start: 2023-12-18 | End: 2023-12-18

## 2023-12-18 RX ORDER — PROPOFOL 10 MG/ML
INJECTION, EMULSION INTRAVENOUS AS NEEDED
Status: DISCONTINUED | OUTPATIENT
Start: 2023-12-18 | End: 2023-12-18

## 2023-12-18 RX ORDER — DEXAMETHASONE SODIUM PHOSPHATE 4 MG/ML
INJECTION, SOLUTION INTRA-ARTICULAR; INTRALESIONAL; INTRAMUSCULAR; INTRAVENOUS; SOFT TISSUE AS NEEDED
Status: DISCONTINUED | OUTPATIENT
Start: 2023-12-18 | End: 2023-12-18

## 2023-12-18 RX ORDER — LIDOCAINE HYDROCHLORIDE 10 MG/ML
0.1 INJECTION INFILTRATION; PERINEURAL ONCE
Status: DISCONTINUED | OUTPATIENT
Start: 2023-12-18 | End: 2023-12-18 | Stop reason: HOSPADM

## 2023-12-18 RX ORDER — BUPIVACAINE HYDROCHLORIDE 5 MG/ML
INJECTION, SOLUTION PERINEURAL AS NEEDED
Status: DISCONTINUED | OUTPATIENT
Start: 2023-12-18 | End: 2023-12-18 | Stop reason: HOSPADM

## 2023-12-18 RX ADMIN — LIDOCAINE HYDROCHLORIDE 50 MG: 10 INJECTION, SOLUTION EPIDURAL; INFILTRATION; INTRACAUDAL; PERINEURAL at 15:24

## 2023-12-18 RX ADMIN — MIDAZOLAM HYDROCHLORIDE 2 MG: 1 INJECTION, SOLUTION INTRAMUSCULAR; INTRAVENOUS at 15:20

## 2023-12-18 RX ADMIN — PROPOFOL 170 MG: 10 INJECTION, EMULSION INTRAVENOUS at 15:24

## 2023-12-18 RX ADMIN — SODIUM CHLORIDE, SODIUM LACTATE, POTASSIUM CHLORIDE, AND CALCIUM CHLORIDE 100 ML/HR: 600; 310; 30; 20 INJECTION, SOLUTION INTRAVENOUS at 13:49

## 2023-12-18 RX ADMIN — CEFAZOLIN SODIUM 2 G: 2 INJECTION, SOLUTION INTRAVENOUS at 15:26

## 2023-12-18 RX ADMIN — FENTANYL CITRATE 50 MCG: 0.05 INJECTION, SOLUTION INTRAMUSCULAR; INTRAVENOUS at 15:39

## 2023-12-18 RX ADMIN — ONDANSETRON HYDROCHLORIDE 4 MG: 2 INJECTION INTRAMUSCULAR; INTRAVENOUS at 15:44

## 2023-12-18 RX ADMIN — PROPOFOL 30 MG: 10 INJECTION, EMULSION INTRAVENOUS at 15:35

## 2023-12-18 RX ADMIN — FENTANYL CITRATE 50 MCG: 0.05 INJECTION, SOLUTION INTRAMUSCULAR; INTRAVENOUS at 15:34

## 2023-12-18 RX ADMIN — DEXAMETHASONE SODIUM PHOSPHATE 8 MG: 4 INJECTION, SOLUTION INTRA-ARTICULAR; INTRALESIONAL; INTRAMUSCULAR; INTRAVENOUS; SOFT TISSUE at 15:32

## 2023-12-18 SDOH — HEALTH STABILITY: MENTAL HEALTH: CURRENT SMOKER: 0

## 2023-12-18 ASSESSMENT — PAIN - FUNCTIONAL ASSESSMENT
PAIN_FUNCTIONAL_ASSESSMENT: 0-10

## 2023-12-18 ASSESSMENT — PAIN SCALES - GENERAL
PAINLEVEL_OUTOF10: 0 - NO PAIN

## 2023-12-18 NOTE — ANESTHESIA PREPROCEDURE EVALUATION
Patient: Juan Miller    Procedure Information       Date/Time: 12/18/23 1430    Procedure: Endoscopic Plantar fasciotomy (Left) - Arthrex EPF set, Cefazolin 2gm IV preop, crutch walking instructions, partial weightbearing, knee high stockings, pneumatic compression device    Location: [facility] / [facility]    Surgeons: Cyrus France DPM          Past Medical History:   Diagnosis Date    BPH (benign prostatic hyperplasia)     Chronic hepatitis C (CMS/HCC)     Hemochromatosis     HTN (hypertension)     Hyperlipidemia     Kidney stones     Liver cirrhosis (CMS/HCC)     Osteoarthritis       Relevant Problems   Cardiovascular   (+) Angina pectoris (CMS/HCC)   (+) Essential hypertension   (+) High blood pressure   (+) Hyperlipidemia      /Renal   (+) Cirrhosis of liver (CMS/HCC)   (+) Hepatitis C virus      GI/Hepatic   (+) Cirrhosis of liver (CMS/HCC)   (+) Hepatitis C virus      Musculoskeletal   (+) Osteoarthritis   (+) Osteoarthritis of knee      Infectious Disease   (+) Hepatitis C virus     Past Surgical History:   Procedure Laterality Date    COLONOSCOPY  02/23/2016    Complete Colonoscopy    ESOPHAGOGASTRODUODENOSCOPY  02/23/2016    Diagnostic Esophagogastroduodenoscopy    HERNIA REPAIR  02/23/2016    Hernia Repair    KNEE SURGERY  02/23/2016    Knee Surgery    LITHOTRIPSY      ureteral stent placement    OTHER SURGICAL HISTORY  02/23/2016    Biopsy Skin      Clinical information reviewed:   Tobacco  Allergies  Meds   Med Hx  Surg Hx   Fam Hx  Soc Hx        NPO Detail:  NPO/Void Status  Date of Last Liquid: 12/17/23  Time of Last Liquid: 2300  Date of Last Solid: 12/17/23  Time of Last Solid: 2300         Physical Exam    Airway  Mallampati: II  TM distance: >3 FB  Neck ROM: full     Cardiovascular    Dental    Pulmonary    Abdominal            Anesthesia Plan    ASA 3     general     The patient is not a current smoker.  Patient was not previously instructed to abstain from smoking on day of  procedure.  Patient did not smoke on day of procedure.    intravenous induction   Postoperative administration of opioids is intended.  Anesthetic plan and risks discussed with patient.    Plan discussed with attending.

## 2023-12-18 NOTE — OP NOTE
Endoscopic Plantar fasciotomy (L) Operative Note     Date: 2023  OR Location: [facility]    Name: Juan Miller, : 1955, Age: 68 y.o., MRN: [id number], Sex: male    Diagnosis  Pre-op Diagnosis     * Plantar fascial fibromatosis [M72.2]     * Left foot pain [M79.672] Post-op Diagnosis     * Plantar fascial fibromatosis [M72.2]     * Left foot pain [M79.672]     Procedures  Endoscopic Plantar fasciotomy  Surgeons      * Cyrus France - Primary    Resident/Fellow/Other Assistant:  Surgeon(s) and Role: Peyman Gar DPM PGY-2    Procedure Summary  Anesthesia: General  ASA: III  Anesthesia Staff: Anesthesiologist: Rl Man MD  C-AA: DENNYS Roche; DENNYS Collado  Estimated Blood Loss: 20 mL  Intra-op Medications:   Medication Name Total Dose   BUPivacaine HCl (Marcaine) 0.5 % (5 mg/mL) injection 10 mL   ceFAZolin in dextrose (iso-os) (Ancef) IVPB 2 g 2 g   lactated Ringer's infusion Cannot be calculated              Anesthesia Record               Intraprocedure I/O Totals          Intake    ceFAZolin in dextrose (iso-os) (Ancef) IVPB 2 g 100.00 mL    Total Intake 100 mL          Specimen: No specimens collected     Staff:   Circulator: Murphy Valencia RN  Scrub Person: Latricia Ray RN      Drains and/or Catheters: * None in log *    Tourniquet Times:     Total Tourniquet Time Documented:  Thigh (Left) - 16 minutes  Total: Thigh (Left) - 16 minutes      Implants: n/a    Findings: Significantly thickened medial band over the plantar fascial ligament    Indications: Juan Miller is an 68 y.o. male who is having surgery for Plantar fascial fibromatosis [M72.2]  Left foot pain [M79.672].     This is a very pleasant 68-year-old male who presents today for elective endoscopic plantar fasciotomy to the left foot.  He came to me as a surgical consultation after several years of failing conservative treatment for chronic recalcitrant heel pain.  An MRI was obtained showing thickening of  the fat plantar fascial ligament.  We discussed endoscopic plantar fasciotomy as a treatment recommendation.  We also discussed alternative treatment recommendations.  He was amenable to surgery.  We discussed at great length the risk benefits nature and alternate treatment recommendations.  Formal written consent was obtained.  The left lower extremity was marked.    The patient was seen in the preoperative area. The risks, benefits, complications, treatment options, non-operative alternatives, expected recovery and outcomes were discussed with the patient. The possibilities of reaction to medication, pulmonary aspiration, injury to surrounding structures, bleeding, recurrent infection, the need for additional procedures, failure to diagnose a condition, and creating a complication requiring transfusion or operation were discussed with the patient. The patient concurred with the proposed plan, giving informed consent.  The site of surgery was properly noted/marked if necessary per policy. The patient has been actively warmed in preoperative area. Preoperative antibiotics 2 g Ancef have been ordered and given within 1 hours of incision. Venous thrombosis prophylaxis have been ordered including unilateral sequential compression device    Procedure Details: Patient was brought to the operating room from the preop holding area and placed supine on the operating room table.  A preop huddle was performed per proper protocol identifying laterality being left, patient identifiers, and procedure.    50 belt was placed.  Ipsilateral hip bump was placed.  Received 2 g of Ancef for surgical infection prophylaxis. general LMA anesthesia was gained.  A calf tourniquet was placed.  The left foot and distal leg were sterilely prepped and draped in usual aseptic fashion with chlorhexidine.  A timeout was performed.    The tourniquet was then inflated to 250 mmHg following limb exsanguination with an Esmarch bandage.  Next an  incision was planned on the medial aspect of the left foot just inferior to the medial calcaneal tubercle at the level of the dorsal and plantar skin margins meet.  Incision was made a #15 blade roughly 1 cm in length.  Blunt dissection was carried down with mosquito hemostat to level of fascial ligament.  This was bowstring with the instrument and we stayed plantar to this.  Next a Rohith fascial elevator was placed plantar to the plantar fasciitis and pushed across the plantar fascial ligament from medial to lateral trajectory.  Next the cannula and obturator were placed from a medial to lateral trajectory.  A 15 blade was used to make a stab incision where the obturator was tenting the skin on the lateral aspect of the foot.  The obturator was removed.  Several cotton tip applicators were then passed through the cannula from the medial to lateral approach.  The endoscope was then focused and white balanced. The scope was placed in the medial portal. The plantar fascial ligament was then directly visualized under endoscopy.  The medial band was identified.  This appeared thickened and hypertrophied.  The scope was then removed from the medial portal and placed in the lateral portal.  A hook blade was then placed in the medial portal.  Again care was taken to only cut the medial band of the plantar fascia ligament.  The hook blade was placed with at the lateral border of the medial band of the plantar fascial ligament and tension was placed cutting the ligament from a lateral to medial trajectory.  Several passes were needed to get through the significantly thickened and hypertrophied ligament.  The muscle belly beneath began to Flynn and herniate through confirming adequate release of the medial band of the plantar fascial ligament.  Care was taken to not cut to the muscle belly to cause hematoma or significant intraoperative bleeding.  The hook blade was then removed from the cannula.  Copious amounts of normal  saline were then injected through the cannula across the surgical site.  The cannula was then removed following obturator placement in the cannula.  Next skin was closed with 3-0 nylon in simple interrupted fashion and horizontal mattress fashion.  Tourniquet was dropped.  Excellent hemostasis was noted and cap refill was returned and noted to be brisk.  10 cc of 0.5% Marcaine plain were injected into the surgical sites.  The left foot was then dressed with Betadine soaked Adaptic, 4 x 4's, ABDs, Kerlix and a Ace bandage.  He was then transferred back to his hospital [facility].  He was extubated.  He tolerated the procedure as well as anesthesia well.  He was transferred back to the postanesthesia care unit where he returned to baseline.  He was found to be hemodynamically stable.  He was ultimately discharged home with instructions to weight-bear to tolerance with the use of a postop shoe.  Prescriptions for Vicodin were sent to his pharmacy.  He has follow-up with me in 1 week.  I did discuss the case with his wife and the preop holding area as well.  Questions were answered.    Complications:  None; patient tolerated the procedure well.    Disposition: PACU - hemodynamically stable.  Condition: stable       Attending Attestation: I was present and scrubbed for the entire procedure.    Cyrus France  Phone Number: [phone]

## 2023-12-18 NOTE — ANESTHESIA PROCEDURE NOTES
Airway  Date/Time: 12/18/2023 3:25 PM  Urgency: elective    Airway not difficult    Staffing  Performed: DENNYS   Authorized by: Rl Man MD    Performed by: DENNYS Collado  Patient location during procedure: OR    Indications and Patient Condition  Indications for airway management: anesthesia  Spontaneous Ventilation: absent  Sedation level: deep  Preoxygenated: yes  Patient position: sniffing  MILS not maintained throughout  Mask difficulty assessment: 0 - not attempted    Final Airway Details  Final airway type: supraglottic airway      Successful airway: classic  Size 4     Number of attempts at approach: 1

## 2023-12-18 NOTE — ANESTHESIA POSTPROCEDURE EVALUATION
Patient: Juan Miller    Procedure Summary       Date: 12/18/23 Room / Location: [facility] / [facility]    Anesthesia Start: 1520 Anesthesia Stop: 1556    Procedure: Endoscopic Plantar fasciotomy (Left: Ankle) Diagnosis:       Plantar fascial fibromatosis      Left foot pain      (Plantar fascial fibromatosis [M72.2])      (Left foot pain [M79.672])    Surgeons: Cyrus France DPM Responsible Provider: Rl Man MD    Anesthesia Type: general ASA Status: 3            Anesthesia Type: general    Vitals Value Taken Time   \89 12/18/23 1613   Temp  12/18/23 1613   Pulse 66 12/18/23 1613   Resp 14 12/18/23 1613   SpO2 96 12/18/23 1613       Anesthesia Post Evaluation    Patient participation: complete - patient participated  Level of consciousness: awake and alert  Pain management: adequate  Airway patency: patent  Cardiovascular status: acceptable  Respiratory status: acceptable  Hydration status: acceptable  Postoperative Nausea and Vomiting: none        No notable events documented.

## 2024-02-02 DIAGNOSIS — I10 ESSENTIAL HYPERTENSION: Primary | ICD-10-CM

## 2024-02-02 RX ORDER — LISINOPRIL 20 MG/1
20 TABLET ORAL DAILY
Qty: 90 TABLET | Refills: 0 | Status: SHIPPED | OUTPATIENT
Start: 2024-02-02 | End: 2024-04-28

## 2024-02-05 ENCOUNTER — INFUSION (OUTPATIENT)
Dept: HEMATOLOGY/ONCOLOGY | Facility: CLINIC | Age: 69
End: 2024-02-05
Payer: MEDICARE

## 2024-02-05 VITALS
SYSTOLIC BLOOD PRESSURE: 136 MMHG | BODY MASS INDEX: 30.91 KG/M2 | OXYGEN SATURATION: 96 % | RESPIRATION RATE: 18 BRPM | DIASTOLIC BLOOD PRESSURE: 93 MMHG | TEMPERATURE: 95.9 F | HEART RATE: 76 BPM | WEIGHT: 215.39 LBS

## 2024-02-05 DIAGNOSIS — E83.110 HEREDITARY HEMOCHROMATOSIS (CMS-HCC): ICD-10-CM

## 2024-02-05 LAB
BASOPHILS # BLD AUTO: 0.08 X10*3/UL (ref 0–0.1)
BASOPHILS NFR BLD AUTO: 1.2 %
EOSINOPHIL # BLD AUTO: 0.1 X10*3/UL (ref 0–0.7)
EOSINOPHIL NFR BLD AUTO: 1.5 %
ERYTHROCYTE [DISTWIDTH] IN BLOOD BY AUTOMATED COUNT: 13.2 % (ref 11.5–14.5)
FERRITIN SERPL-MCNC: 55 NG/ML (ref 20–300)
HCT VFR BLD AUTO: 45.6 % (ref 41–52)
HGB BLD-MCNC: 15.6 G/DL (ref 13.5–17.5)
IMM GRANULOCYTES # BLD AUTO: 0.02 X10*3/UL (ref 0–0.7)
IMM GRANULOCYTES NFR BLD AUTO: 0.3 % (ref 0–0.9)
LYMPHOCYTES # BLD AUTO: 1.86 X10*3/UL (ref 1.2–4.8)
LYMPHOCYTES NFR BLD AUTO: 28.7 %
MCH RBC QN AUTO: 31.9 PG (ref 26–34)
MCHC RBC AUTO-ENTMCNC: 34.2 G/DL (ref 32–36)
MCV RBC AUTO: 93 FL (ref 80–100)
MONOCYTES # BLD AUTO: 0.52 X10*3/UL (ref 0.1–1)
MONOCYTES NFR BLD AUTO: 8 %
NEUTROPHILS # BLD AUTO: 3.9 X10*3/UL (ref 1.2–7.7)
NEUTROPHILS NFR BLD AUTO: 60.3 %
NRBC BLD-RTO: 0 /100 WBCS (ref 0–0)
PLATELET # BLD AUTO: 173 X10*3/UL (ref 150–450)
RBC # BLD AUTO: 4.89 X10*6/UL (ref 4.5–5.9)
WBC # BLD AUTO: 6.5 X10*3/UL (ref 4.4–11.3)

## 2024-02-05 PROCEDURE — 82728 ASSAY OF FERRITIN: CPT | Mod: WESLAB | Performed by: NURSE PRACTITIONER

## 2024-02-05 PROCEDURE — 85025 COMPLETE CBC W/AUTO DIFF WBC: CPT | Performed by: NURSE PRACTITIONER

## 2024-02-05 PROCEDURE — 36415 COLL VENOUS BLD VENIPUNCTURE: CPT

## 2024-02-05 ASSESSMENT — PAIN SCALES - GENERAL: PAINLEVEL: 0-NO PAIN

## 2024-02-05 NOTE — PROGRESS NOTES
Ferritin not able to r/c same day, hgb 15.6 and hematocrit 45.6 no need to phleb today per Clint Gonzales NP. Pt will return in April for repeat labs.

## 2024-04-01 ENCOUNTER — INFUSION (OUTPATIENT)
Dept: HEMATOLOGY/ONCOLOGY | Facility: CLINIC | Age: 69
End: 2024-04-01
Payer: MEDICARE

## 2024-04-01 VITALS
OXYGEN SATURATION: 95 % | RESPIRATION RATE: 18 BRPM | BODY MASS INDEX: 29.48 KG/M2 | DIASTOLIC BLOOD PRESSURE: 81 MMHG | SYSTOLIC BLOOD PRESSURE: 124 MMHG | WEIGHT: 205.47 LBS | HEART RATE: 86 BPM | TEMPERATURE: 97 F

## 2024-04-01 DIAGNOSIS — E83.110 HEREDITARY HEMOCHROMATOSIS (CMS-HCC): ICD-10-CM

## 2024-04-01 LAB
ALBUMIN SERPL BCP-MCNC: 4.7 G/DL (ref 3.4–5)
ALP SERPL-CCNC: 55 U/L (ref 33–136)
ALT SERPL W P-5'-P-CCNC: 54 U/L (ref 10–52)
ANION GAP SERPL CALC-SCNC: 14 MMOL/L (ref 10–20)
AST SERPL W P-5'-P-CCNC: 39 U/L (ref 9–39)
BASOPHILS # BLD AUTO: 0.03 X10*3/UL (ref 0–0.1)
BASOPHILS NFR BLD AUTO: 0.3 %
BILIRUB DIRECT SERPL-MCNC: 0.3 MG/DL (ref 0–0.3)
BILIRUB SERPL-MCNC: 1.7 MG/DL (ref 0–1.2)
BUN SERPL-MCNC: 19 MG/DL (ref 6–23)
CALCIUM SERPL-MCNC: 9.6 MG/DL (ref 8.6–10.3)
CHLORIDE SERPL-SCNC: 103 MMOL/L (ref 98–107)
CO2 SERPL-SCNC: 24 MMOL/L (ref 21–32)
CREAT SERPL-MCNC: 0.97 MG/DL (ref 0.5–1.3)
EGFRCR SERPLBLD CKD-EPI 2021: 86 ML/MIN/1.73M*2
EOSINOPHIL # BLD AUTO: 0.02 X10*3/UL (ref 0–0.7)
EOSINOPHIL NFR BLD AUTO: 0.2 %
ERYTHROCYTE [DISTWIDTH] IN BLOOD BY AUTOMATED COUNT: 13.4 % (ref 11.5–14.5)
FERRITIN SERPL-MCNC: 138 NG/ML (ref 20–300)
GLUCOSE SERPL-MCNC: 137 MG/DL (ref 74–99)
HCT VFR BLD AUTO: 47.3 % (ref 41–52)
HGB BLD-MCNC: 16.5 G/DL (ref 13.5–17.5)
IMM GRANULOCYTES # BLD AUTO: 0.03 X10*3/UL (ref 0–0.7)
IMM GRANULOCYTES NFR BLD AUTO: 0.3 % (ref 0–0.9)
IRON SATN MFR SERPL: 48 % (ref 25–45)
IRON SERPL-MCNC: 210 UG/DL (ref 35–150)
LYMPHOCYTES # BLD AUTO: 1.49 X10*3/UL (ref 1.2–4.8)
LYMPHOCYTES NFR BLD AUTO: 17 %
MCH RBC QN AUTO: 32 PG (ref 26–34)
MCHC RBC AUTO-ENTMCNC: 34.9 G/DL (ref 32–36)
MCV RBC AUTO: 92 FL (ref 80–100)
MONOCYTES # BLD AUTO: 0.36 X10*3/UL (ref 0.1–1)
MONOCYTES NFR BLD AUTO: 4.1 %
NEUTROPHILS # BLD AUTO: 6.81 X10*3/UL (ref 1.2–7.7)
NEUTROPHILS NFR BLD AUTO: 78.1 %
NRBC BLD-RTO: 0 /100 WBCS (ref 0–0)
PLATELET # BLD AUTO: 188 X10*3/UL (ref 150–450)
POTASSIUM SERPL-SCNC: 3.9 MMOL/L (ref 3.5–5.3)
PROT SERPL-MCNC: 7.1 G/DL (ref 6.4–8.2)
RBC # BLD AUTO: 5.15 X10*6/UL (ref 4.5–5.9)
SODIUM SERPL-SCNC: 137 MMOL/L (ref 136–145)
TIBC SERPL-MCNC: 438 UG/DL (ref 240–445)
UIBC SERPL-MCNC: 228 UG/DL (ref 110–370)
WBC # BLD AUTO: 8.7 X10*3/UL (ref 4.4–11.3)

## 2024-04-01 PROCEDURE — 82374 ASSAY BLOOD CARBON DIOXIDE: CPT | Performed by: NURSE PRACTITIONER

## 2024-04-01 PROCEDURE — 85025 COMPLETE CBC W/AUTO DIFF WBC: CPT | Performed by: NURSE PRACTITIONER

## 2024-04-01 PROCEDURE — 82728 ASSAY OF FERRITIN: CPT | Performed by: NURSE PRACTITIONER

## 2024-04-01 PROCEDURE — 83540 ASSAY OF IRON: CPT | Performed by: NURSE PRACTITIONER

## 2024-04-01 PROCEDURE — 82248 BILIRUBIN DIRECT: CPT | Performed by: NURSE PRACTITIONER

## 2024-04-01 PROCEDURE — 99195 PHLEBOTOMY: CPT

## 2024-04-01 RX ORDER — ALBUTEROL SULFATE 0.83 MG/ML
3 SOLUTION RESPIRATORY (INHALATION) AS NEEDED
Status: CANCELLED | OUTPATIENT
Start: 2024-05-27

## 2024-04-01 RX ORDER — FAMOTIDINE 10 MG/ML
20 INJECTION INTRAVENOUS ONCE AS NEEDED
Status: CANCELLED | OUTPATIENT
Start: 2024-05-27

## 2024-04-01 RX ORDER — HEPARIN SODIUM,PORCINE/PF 10 UNIT/ML
50 SYRINGE (ML) INTRAVENOUS AS NEEDED
OUTPATIENT
Start: 2024-04-01

## 2024-04-01 RX ORDER — DIPHENHYDRAMINE HYDROCHLORIDE 50 MG/ML
50 INJECTION INTRAMUSCULAR; INTRAVENOUS AS NEEDED
Status: CANCELLED | OUTPATIENT
Start: 2024-05-27

## 2024-04-01 RX ORDER — EPINEPHRINE 0.3 MG/.3ML
0.3 INJECTION SUBCUTANEOUS EVERY 5 MIN PRN
Status: CANCELLED | OUTPATIENT
Start: 2024-05-27

## 2024-04-01 RX ORDER — HEPARIN SODIUM,PORCINE/PF 10 UNIT/ML
50 SYRINGE (ML) INTRAVENOUS AS NEEDED
Status: DISCONTINUED | OUTPATIENT
Start: 2024-04-01 | End: 2024-04-01 | Stop reason: HOSPADM

## 2024-04-01 ASSESSMENT — PAIN SCALES - GENERAL: PAINLEVEL: 0-NO PAIN

## 2024-04-27 DIAGNOSIS — I10 ESSENTIAL HYPERTENSION: ICD-10-CM

## 2024-04-28 RX ORDER — LISINOPRIL 20 MG/1
20 TABLET ORAL DAILY
Qty: 90 TABLET | Refills: 0 | Status: SHIPPED | OUTPATIENT
Start: 2024-04-28 | End: 2024-05-08 | Stop reason: SDUPTHER

## 2024-05-08 ENCOUNTER — OFFICE VISIT (OUTPATIENT)
Dept: PRIMARY CARE | Facility: CLINIC | Age: 69
End: 2024-05-08
Payer: MEDICARE

## 2024-05-08 VITALS
OXYGEN SATURATION: 99 % | SYSTOLIC BLOOD PRESSURE: 124 MMHG | WEIGHT: 201 LBS | HEART RATE: 79 BPM | BODY MASS INDEX: 28.77 KG/M2 | HEIGHT: 70 IN | DIASTOLIC BLOOD PRESSURE: 80 MMHG

## 2024-05-08 DIAGNOSIS — E55.9 VITAMIN D DEFICIENCY: ICD-10-CM

## 2024-05-08 DIAGNOSIS — I10 ESSENTIAL HYPERTENSION: ICD-10-CM

## 2024-05-08 DIAGNOSIS — R53.83 FATIGUE, UNSPECIFIED TYPE: ICD-10-CM

## 2024-05-08 DIAGNOSIS — R35.1 BENIGN PROSTATIC HYPERPLASIA WITH NOCTURIA: ICD-10-CM

## 2024-05-08 DIAGNOSIS — Z00.00 MEDICARE ANNUAL WELLNESS VISIT, SUBSEQUENT: Primary | ICD-10-CM

## 2024-05-08 DIAGNOSIS — E83.110 HEREDITARY HEMOCHROMATOSIS (CMS-HCC): ICD-10-CM

## 2024-05-08 DIAGNOSIS — L98.9 SKIN LESIONS: ICD-10-CM

## 2024-05-08 DIAGNOSIS — N40.1 BENIGN PROSTATIC HYPERPLASIA WITH NOCTURIA: ICD-10-CM

## 2024-05-08 DIAGNOSIS — E78.2 MIXED HYPERLIPIDEMIA: ICD-10-CM

## 2024-05-08 DIAGNOSIS — Z12.11 ENCOUNTER FOR SCREENING FOR MALIGNANT NEOPLASM OF COLON: ICD-10-CM

## 2024-05-08 DIAGNOSIS — Z23 ENCOUNTER FOR IMMUNIZATION: ICD-10-CM

## 2024-05-08 DIAGNOSIS — R35.1 NOCTURIA: ICD-10-CM

## 2024-05-08 PROBLEM — I20.9 ANGINA PECTORIS (CMS-HCC): Status: RESOLVED | Noted: 2023-09-12 | Resolved: 2024-05-08

## 2024-05-08 PROBLEM — K74.60 CIRRHOSIS OF LIVER (MULTI): Status: RESOLVED | Noted: 2023-09-12 | Resolved: 2024-05-08

## 2024-05-08 PROCEDURE — 1123F ACP DISCUSS/DSCN MKR DOCD: CPT | Performed by: FAMILY MEDICINE

## 2024-05-08 PROCEDURE — 99214 OFFICE O/P EST MOD 30 MIN: CPT | Performed by: FAMILY MEDICINE

## 2024-05-08 PROCEDURE — 3074F SYST BP LT 130 MM HG: CPT | Performed by: FAMILY MEDICINE

## 2024-05-08 PROCEDURE — G0439 PPPS, SUBSEQ VISIT: HCPCS | Performed by: FAMILY MEDICINE

## 2024-05-08 PROCEDURE — 1126F AMNT PAIN NOTED NONE PRSNT: CPT | Performed by: FAMILY MEDICINE

## 2024-05-08 PROCEDURE — 1170F FXNL STATUS ASSESSED: CPT | Performed by: FAMILY MEDICINE

## 2024-05-08 PROCEDURE — 90677 PCV20 VACCINE IM: CPT | Performed by: FAMILY MEDICINE

## 2024-05-08 PROCEDURE — 3079F DIAST BP 80-89 MM HG: CPT | Performed by: FAMILY MEDICINE

## 2024-05-08 PROCEDURE — 99215 OFFICE O/P EST HI 40 MIN: CPT | Performed by: FAMILY MEDICINE

## 2024-05-08 PROCEDURE — 1036F TOBACCO NON-USER: CPT | Performed by: FAMILY MEDICINE

## 2024-05-08 PROCEDURE — 3008F BODY MASS INDEX DOCD: CPT | Performed by: FAMILY MEDICINE

## 2024-05-08 PROCEDURE — 1160F RVW MEDS BY RX/DR IN RCRD: CPT | Performed by: FAMILY MEDICINE

## 2024-05-08 PROCEDURE — 1159F MED LIST DOCD IN RCRD: CPT | Performed by: FAMILY MEDICINE

## 2024-05-08 PROCEDURE — 1158F ADVNC CARE PLAN TLK DOCD: CPT | Performed by: FAMILY MEDICINE

## 2024-05-08 RX ORDER — LISINOPRIL 20 MG/1
20 TABLET ORAL DAILY
Qty: 90 TABLET | Refills: 3 | Status: SHIPPED | OUTPATIENT
Start: 2024-05-08

## 2024-05-08 ASSESSMENT — ENCOUNTER SYMPTOMS
TREMORS: 0
FREQUENCY: 0
MYALGIAS: 0
COUGH: 0
NERVOUS/ANXIOUS: 0
CONFUSION: 0
WEAKNESS: 0
HEMATURIA: 0
CONSTIPATION: 0
LOSS OF SENSATION IN FEET: 0
CHILLS: 0
VOMITING: 0
NAUSEA: 0
OCCASIONAL FEELINGS OF UNSTEADINESS: 0
ARTHRALGIAS: 1
DEPRESSION: 0
WHEEZING: 0
FATIGUE: 1
DIZZINESS: 0
PALPITATIONS: 0
SHORTNESS OF BREATH: 0
FEVER: 0

## 2024-05-08 ASSESSMENT — ACTIVITIES OF DAILY LIVING (ADL)
USING TRANSPORTATION: INDEPENDENT
USING TELEPHONE: INDEPENDENT
GROCERY SHOPPING: INDEPENDENT
STIL DRIVING: YES
BATHING: INDEPENDENT
TOILETING: INDEPENDENT
EATING: INDEPENDENT
ADEQUATE_TO_COMPLETE_ADL: YES
DOING HOUSEWORK: INDEPENDENT
JUDGMENT_ADEQUATE_SAFELY_COMPLETE_DAILY_ACTIVITIES: YES
MANAGING FINANCES: INDEPENDENT
FEEDING: INDEPENDENT
DRESSING: INDEPENDENT
PILL BOX USED: YES
PREPARING MEALS: INDEPENDENT
NEEDS ASSISTANCE WITH FOOD: INDEPENDENT
TAKING MEDICATION: INDEPENDENT

## 2024-05-08 ASSESSMENT — PATIENT HEALTH QUESTIONNAIRE - PHQ9
1. LITTLE INTEREST OR PLEASURE IN DOING THINGS: NOT AT ALL
SUM OF ALL RESPONSES TO PHQ9 QUESTIONS 1 AND 2: 0
2. FEELING DOWN, DEPRESSED OR HOPELESS: NOT AT ALL

## 2024-05-08 ASSESSMENT — COGNITIVE AND FUNCTIONAL STATUS - GENERAL: VERBAL FLUENCY - ANIMAL NAMES (0 TO 25): 3

## 2024-05-08 ASSESSMENT — GERIATRIC MINI NUTRITIONAL ASSESSMENT (MNA)
C GENERAL MOBILITY: GOES OUT
E NEUROPSYCHOLOGICAL PROBLEMS: NO PSYCHOLOGICAL PROBLEMS
A HAS FOOD INTAKE DECLINED OVER THE PAST 3 MONTHS DUE TO LOSS OF APPETITE, DIGESTIVE PROBLEMS, CHEWING OR SWALLOWING DIFFICULTIES?: NO DECREASE IN FOOD INTAKE
B WEIGHT LOSS DURING THE LAST 3 MONTHS: DOES NOT KNOW

## 2024-05-08 ASSESSMENT — PAIN SCALES - GENERAL: PAINLEVEL: 0-NO PAIN

## 2024-05-08 NOTE — PROGRESS NOTES
Subjective   Patient ID: Juan Miller is a 68 y.o. male who presents for Annual Exam.    Mini Cognitive Screening:          Three Word Recall             Words:  Book, apple, window.            Patient able to repeat?  Yes .         Three Word Recall after 5mins             Word recall Score (0-3):  3 .         Clock Drawing             Given preprinted [place] and instructions?  Yes .            Clock Draw Score (0 or 2):  2 .         Clock Draw plus Word Recall Score             Mini Cog Result <3 Pos:  5 .         Follow-up:             .  Not needed, negative result .     General Comments:          Patient here for Medicare Wellness Exam.         Active medical problems:         PMH/PSH/FMH/SHX: reviewed, noted below.         Other providers:         Medications: reviewed, noted below.         Depression screening: denies feeling down, depressed, hopeless. Denies having felt little interest or pleasure in doing things.         Functional ability and safety: Get up and go test is <30s. Pt denies any issues with ADLs, including phone, transportation, shopping, preparing meals, housework,laundry, medications or managing money. No home safety concerns, including having rugs in the hallway, lack grab bars in the bathroom, lack handrails, on the stairs or have poor lighting. Pt denies falls.         Cognitive evaluation: MMSE         Hearing changes: Pt denies changes or concerns.         Advanced directives: discussed and recommended. His wife         Preventative services: sheet reviewed, scanned, copy provided to patient.     Hyperlipidemia:          Patient here for F/U. currently trying to control with therapeutic lifestyle changes- I did discuss that I prefer he take a statin - he will consider depending on labs (he has hemochromatotsis and 1/4 arteries with positive calcium score).          Labs ordered today.      Hypertension:          Patient here for F/U. at goal.          Med compliance yes.           "Med side effects none.          Chest pain - none Sees Dr Patel         Leg edema none.          Palpitations none.          Shortness of breath none.          Syncope none.      Hemochromatosis:          he sees hematologist routinely for checks - gets phlebodomies as needed.  UTD on colonoscopy - due for prevnar 20 and fasting labs  No use of opioids   Feels he has a good quality of life  Family History:  Father:  80 yrs, diagnosed with Diabetes, Heart Disease.  Mother:  74 yrs, emphysema, osteoporosis.  Paternal Grand Father: , family history unknown .  Paternal Grand Mother: , family history unknown .  Maternal Grand Father: , family history unknown .  Maternal Grand Mother: , diagnosed with Diabetes.  Brother 1: alive.  Brother 2: alive.  Brother 3: alive.  Sister 1: alive, diagnosed with Diabetes, Hypertension, Stroke.  Siblings: alive, hemochromatosis, diabetes.  Son(s): alive.  Daughter(s): , diagnosed with Heart Disease.  5 brother(s) , 1 sister(s) . 2 son(s) - healthy. .    daughter  from MI.         Review of Systems   Constitutional:  Positive for fatigue. Negative for chills and fever.   HENT:  Negative for ear pain and postnasal drip.    Respiratory:  Negative for cough, shortness of breath and wheezing.    Cardiovascular:  Negative for chest pain, palpitations and leg swelling.   Gastrointestinal:  Negative for constipation, nausea and vomiting.   Endocrine: Negative for cold intolerance.   Genitourinary:  Negative for frequency and hematuria.   Musculoskeletal:  Positive for arthralgias. Negative for myalgias.   Skin:  Negative for rash.   Neurological:  Negative for dizziness, tremors and weakness.   Psychiatric/Behavioral:  Negative for confusion. The patient is not nervous/anxious.    Vit D def, nocturia    Objective   /80   Pulse 79   Ht 1.778 m (5' 10\")   Wt 91.2 kg (201 lb)   SpO2 99%   BMI 28.84 kg/m²     Physical " Exam  Vitals reviewed.   Constitutional:       General: He is not in acute distress.     Appearance: Normal appearance. He is not ill-appearing.   HENT:      Right Ear: Tympanic membrane normal.      Left Ear: Tympanic membrane normal.      Nose: No rhinorrhea.      Mouth/Throat:      Pharynx: No posterior oropharyngeal erythema.   Cardiovascular:      Rate and Rhythm: Normal rate and regular rhythm.      Heart sounds: Normal heart sounds. No murmur heard.  Pulmonary:      Breath sounds: Normal breath sounds.   Abdominal:      General: Abdomen is flat. Bowel sounds are normal. There is no distension.      Palpations: Abdomen is soft. There is no mass.      Tenderness: There is no abdominal tenderness.   Musculoskeletal:      Cervical back: Neck supple.      Right lower leg: No edema.      Left lower leg: No edema.   Skin:     Findings: No lesion or rash.   Neurological:      General: No focal deficit present.      Mental Status: He is alert and oriented to person, place, and time.   Psychiatric:         Mood and Affect: Mood normal.         Behavior: Behavior normal.         Assessment/Plan   Problem List Items Addressed This Visit             ICD-10-CM    Benign prostatic hyperplasia N40.0    Relevant Orders    Prostate Specific Antigen, Screen    Essential hypertension I10    Relevant Medications    lisinopril 20 mg tablet    Hemochromatosis E83.119    Hyperlipidemia E78.5    Relevant Orders    Comprehensive Metabolic Panel    Lipid Panel    Vitamin D deficiency E55.9    Relevant Orders    Vitamin D 25-Hydroxy,Total (for eval of Vitamin D levels)    BMI 28.0-28.9,adult Z68.28     Other Visit Diagnoses         Codes    Medicare annual wellness visit, subsequent    -  Primary Z00.00    Fatigue, unspecified type     R53.83    Relevant Orders    TSH with reflex to Free T4 if abnormal    Nocturia     R35.1    Relevant Orders    Urinalysis with Reflex Culture and Microscopic    Encounter for immunization     Z23     Encounter for screening for malignant neoplasm of colon     Z12.11    colonoscopy 2019 - repeat in 10 years     Skin lesions     L98.9    referral to Dr Cruz

## 2024-05-09 ENCOUNTER — LAB (OUTPATIENT)
Dept: LAB | Facility: LAB | Age: 69
End: 2024-05-09
Payer: MEDICARE

## 2024-05-09 DIAGNOSIS — N40.1 BENIGN PROSTATIC HYPERPLASIA WITH NOCTURIA: ICD-10-CM

## 2024-05-09 DIAGNOSIS — R35.1 BENIGN PROSTATIC HYPERPLASIA WITH NOCTURIA: ICD-10-CM

## 2024-05-09 DIAGNOSIS — E78.2 MIXED HYPERLIPIDEMIA: ICD-10-CM

## 2024-05-09 DIAGNOSIS — E83.52 HYPERCALCEMIA: Primary | ICD-10-CM

## 2024-05-09 DIAGNOSIS — R53.83 FATIGUE, UNSPECIFIED TYPE: ICD-10-CM

## 2024-05-09 DIAGNOSIS — R35.1 NOCTURIA: ICD-10-CM

## 2024-05-09 DIAGNOSIS — E55.9 VITAMIN D DEFICIENCY: ICD-10-CM

## 2024-05-09 LAB
ALBUMIN SERPL BCP-MCNC: 4.8 G/DL (ref 3.4–5)
ALP SERPL-CCNC: 62 U/L (ref 33–136)
ALT SERPL W P-5'-P-CCNC: 33 U/L (ref 10–52)
ANION GAP SERPL CALC-SCNC: 12 MMOL/L (ref 10–20)
APPEARANCE UR: CLEAR
AST SERPL W P-5'-P-CCNC: 29 U/L (ref 9–39)
BILIRUB SERPL-MCNC: 1.1 MG/DL (ref 0–1.2)
BILIRUB UR STRIP.AUTO-MCNC: NEGATIVE MG/DL
BUN SERPL-MCNC: 21 MG/DL (ref 6–23)
CALCIUM SERPL-MCNC: 10.4 MG/DL (ref 8.6–10.3)
CHLORIDE SERPL-SCNC: 106 MMOL/L (ref 98–107)
CHOLEST SERPL-MCNC: 196 MG/DL (ref 0–199)
CHOLESTEROL/HDL RATIO: 3.9
CO2 SERPL-SCNC: 26 MMOL/L (ref 21–32)
COLOR UR: NORMAL
CREAT SERPL-MCNC: 1.21 MG/DL (ref 0.5–1.3)
EGFRCR SERPLBLD CKD-EPI 2021: 66 ML/MIN/1.73M*2
GLUCOSE SERPL-MCNC: 97 MG/DL (ref 74–99)
GLUCOSE UR STRIP.AUTO-MCNC: NORMAL MG/DL
HDLC SERPL-MCNC: 50.4 MG/DL
HOLD SPECIMEN: NORMAL
KETONES UR STRIP.AUTO-MCNC: NEGATIVE MG/DL
LDLC SERPL CALC-MCNC: 124 MG/DL
LEUKOCYTE ESTERASE UR QL STRIP.AUTO: NEGATIVE
NITRITE UR QL STRIP.AUTO: NEGATIVE
NON HDL CHOLESTEROL: 146 MG/DL (ref 0–149)
PH UR STRIP.AUTO: 5.5 [PH]
POTASSIUM SERPL-SCNC: 5.2 MMOL/L (ref 3.5–5.3)
PROT SERPL-MCNC: 7.3 G/DL (ref 6.4–8.2)
PROT UR STRIP.AUTO-MCNC: NEGATIVE MG/DL
RBC # UR STRIP.AUTO: NEGATIVE /UL
SODIUM SERPL-SCNC: 139 MMOL/L (ref 136–145)
SP GR UR STRIP.AUTO: 1.01
TRIGL SERPL-MCNC: 109 MG/DL (ref 0–149)
TSH SERPL-ACNC: 0.62 MIU/L (ref 0.44–3.98)
UROBILINOGEN UR STRIP.AUTO-MCNC: NORMAL MG/DL
VLDL: 22 MG/DL (ref 0–40)

## 2024-05-09 PROCEDURE — 81003 URINALYSIS AUTO W/O SCOPE: CPT

## 2024-05-09 PROCEDURE — 84443 ASSAY THYROID STIM HORMONE: CPT

## 2024-05-09 PROCEDURE — 80061 LIPID PANEL: CPT

## 2024-05-09 PROCEDURE — 84153 ASSAY OF PSA TOTAL: CPT

## 2024-05-09 PROCEDURE — 36415 COLL VENOUS BLD VENIPUNCTURE: CPT

## 2024-05-09 PROCEDURE — 82306 VITAMIN D 25 HYDROXY: CPT

## 2024-05-09 PROCEDURE — 80053 COMPREHEN METABOLIC PANEL: CPT

## 2024-05-10 LAB
25(OH)D3 SERPL-MCNC: 51 NG/ML (ref 30–100)
PSA SERPL-MCNC: 0.84 NG/ML

## 2024-05-13 ENCOUNTER — TELEPHONE (OUTPATIENT)
Dept: PRIMARY CARE | Facility: CLINIC | Age: 69
End: 2024-05-13
Payer: COMMERCIAL

## 2024-05-13 NOTE — TELEPHONE ENCOUNTER
----- Message from Reg Perrin MD sent at 5/9/2024  8:14 PM EDT -----    ----- Message -----  From: Lab, Background User  Sent: 5/9/2024   3:29 PM EDT  To: Reg Perrin MD

## 2024-05-15 ENCOUNTER — TELEPHONE (OUTPATIENT)
Dept: PRIMARY CARE | Facility: CLINIC | Age: 69
End: 2024-05-15
Payer: COMMERCIAL

## 2024-05-15 DIAGNOSIS — E78.2 MIXED HYPERLIPIDEMIA: Primary | ICD-10-CM

## 2024-05-15 RX ORDER — EZETIMIBE 10 MG/1
10 TABLET ORAL DAILY
Qty: 90 TABLET | Refills: 1 | Status: SHIPPED | OUTPATIENT
Start: 2024-05-15 | End: 2025-05-15

## 2024-05-15 NOTE — TELEPHONE ENCOUNTER
Patient notified of recent lab results. He agrees to start Zetia. Send to [facility] in [place].

## 2024-05-28 ENCOUNTER — INFUSION (OUTPATIENT)
Dept: HEMATOLOGY/ONCOLOGY | Facility: CLINIC | Age: 69
End: 2024-05-28
Payer: MEDICARE

## 2024-05-28 ENCOUNTER — TELEPHONE (OUTPATIENT)
Dept: HEMATOLOGY/ONCOLOGY | Facility: CLINIC | Age: 69
End: 2024-05-28

## 2024-05-28 VITALS
SYSTOLIC BLOOD PRESSURE: 107 MMHG | TEMPERATURE: 95.7 F | OXYGEN SATURATION: 96 % | BODY MASS INDEX: 29.17 KG/M2 | DIASTOLIC BLOOD PRESSURE: 70 MMHG | RESPIRATION RATE: 17 BRPM | HEART RATE: 74 BPM | WEIGHT: 203.26 LBS

## 2024-05-28 DIAGNOSIS — E83.110 HEREDITARY HEMOCHROMATOSIS (CMS-HCC): Primary | ICD-10-CM

## 2024-05-28 LAB
BASOPHILS # BLD AUTO: 0.06 X10*3/UL (ref 0–0.1)
BASOPHILS NFR BLD AUTO: 0.9 %
EOSINOPHIL # BLD AUTO: 0.08 X10*3/UL (ref 0–0.7)
EOSINOPHIL NFR BLD AUTO: 1.2 %
ERYTHROCYTE [DISTWIDTH] IN BLOOD BY AUTOMATED COUNT: 13.4 % (ref 11.5–14.5)
HCT VFR BLD AUTO: 47.9 % (ref 41–52)
HGB BLD-MCNC: 16.4 G/DL (ref 13.5–17.5)
IMM GRANULOCYTES # BLD AUTO: 0.01 X10*3/UL (ref 0–0.7)
IMM GRANULOCYTES NFR BLD AUTO: 0.1 % (ref 0–0.9)
LYMPHOCYTES # BLD AUTO: 2.04 X10*3/UL (ref 1.2–4.8)
LYMPHOCYTES NFR BLD AUTO: 29.8 %
MCH RBC QN AUTO: 32.4 PG (ref 26–34)
MCHC RBC AUTO-ENTMCNC: 34.2 G/DL (ref 32–36)
MCV RBC AUTO: 95 FL (ref 80–100)
MONOCYTES # BLD AUTO: 0.47 X10*3/UL (ref 0.1–1)
MONOCYTES NFR BLD AUTO: 6.9 %
NEUTROPHILS # BLD AUTO: 4.19 X10*3/UL (ref 1.2–7.7)
NEUTROPHILS NFR BLD AUTO: 61.1 %
NRBC BLD-RTO: 0 /100 WBCS (ref 0–0)
PLATELET # BLD AUTO: 192 X10*3/UL (ref 150–450)
RBC # BLD AUTO: 5.06 X10*6/UL (ref 4.5–5.9)
WBC # BLD AUTO: 6.9 X10*3/UL (ref 4.4–11.3)

## 2024-05-28 PROCEDURE — 99195 PHLEBOTOMY: CPT

## 2024-05-28 PROCEDURE — 85025 COMPLETE CBC W/AUTO DIFF WBC: CPT | Performed by: NURSE PRACTITIONER

## 2024-05-28 PROCEDURE — 82728 ASSAY OF FERRITIN: CPT | Performed by: NURSE PRACTITIONER

## 2024-05-28 RX ORDER — EPINEPHRINE 0.3 MG/.3ML
0.3 INJECTION SUBCUTANEOUS EVERY 5 MIN PRN
OUTPATIENT
Start: 2024-07-22

## 2024-05-28 RX ORDER — ALBUTEROL SULFATE 0.83 MG/ML
3 SOLUTION RESPIRATORY (INHALATION) AS NEEDED
OUTPATIENT
Start: 2024-07-22

## 2024-05-28 RX ORDER — DIPHENHYDRAMINE HYDROCHLORIDE 50 MG/ML
50 INJECTION INTRAMUSCULAR; INTRAVENOUS AS NEEDED
OUTPATIENT
Start: 2024-07-22

## 2024-05-28 RX ORDER — FAMOTIDINE 10 MG/ML
20 INJECTION INTRAVENOUS ONCE AS NEEDED
OUTPATIENT
Start: 2024-07-22

## 2024-05-28 ASSESSMENT — PAIN SCALES - GENERAL: PAINLEVEL: 0-NO PAIN

## 2024-05-29 LAB — FERRITIN SERPL-MCNC: 78 NG/ML (ref 20–300)

## 2024-07-22 ENCOUNTER — APPOINTMENT (OUTPATIENT)
Dept: HEMATOLOGY/ONCOLOGY | Facility: CLINIC | Age: 69
End: 2024-07-22
Payer: MEDICARE

## 2024-07-23 ENCOUNTER — INFUSION (OUTPATIENT)
Dept: HEMATOLOGY/ONCOLOGY | Facility: CLINIC | Age: 69
End: 2024-07-23
Payer: MEDICARE

## 2024-07-23 VITALS
WEIGHT: 200.4 LBS | BODY MASS INDEX: 28.75 KG/M2 | TEMPERATURE: 96.4 F | SYSTOLIC BLOOD PRESSURE: 131 MMHG | RESPIRATION RATE: 16 BRPM | DIASTOLIC BLOOD PRESSURE: 85 MMHG | HEART RATE: 80 BPM | OXYGEN SATURATION: 97 %

## 2024-07-23 DIAGNOSIS — E83.110 HEREDITARY HEMOCHROMATOSIS (CMS-HCC): ICD-10-CM

## 2024-07-23 LAB
BASOPHILS # BLD AUTO: 0.04 X10*3/UL (ref 0–0.1)
BASOPHILS NFR BLD AUTO: 0.7 %
EOSINOPHIL # BLD AUTO: 0.08 X10*3/UL (ref 0–0.7)
EOSINOPHIL NFR BLD AUTO: 1.3 %
ERYTHROCYTE [DISTWIDTH] IN BLOOD BY AUTOMATED COUNT: 13 % (ref 11.5–14.5)
FERRITIN SERPL-MCNC: 74 NG/ML (ref 20–300)
HCT VFR BLD AUTO: 44.5 % (ref 41–52)
HGB BLD-MCNC: 15.3 G/DL (ref 13.5–17.5)
IMM GRANULOCYTES # BLD AUTO: 0.02 X10*3/UL (ref 0–0.7)
IMM GRANULOCYTES NFR BLD AUTO: 0.3 % (ref 0–0.9)
LYMPHOCYTES # BLD AUTO: 1.45 X10*3/UL (ref 1.2–4.8)
LYMPHOCYTES NFR BLD AUTO: 24.2 %
MCH RBC QN AUTO: 32.7 PG (ref 26–34)
MCHC RBC AUTO-ENTMCNC: 34.4 G/DL (ref 32–36)
MCV RBC AUTO: 95 FL (ref 80–100)
MONOCYTES # BLD AUTO: 0.39 X10*3/UL (ref 0.1–1)
MONOCYTES NFR BLD AUTO: 6.5 %
NEUTROPHILS # BLD AUTO: 4.01 X10*3/UL (ref 1.2–7.7)
NEUTROPHILS NFR BLD AUTO: 67 %
NRBC BLD-RTO: 0 /100 WBCS (ref 0–0)
PLATELET # BLD AUTO: 178 X10*3/UL (ref 150–450)
RBC # BLD AUTO: 4.68 X10*6/UL (ref 4.5–5.9)
WBC # BLD AUTO: 6 X10*3/UL (ref 4.4–11.3)

## 2024-07-23 PROCEDURE — 99195 PHLEBOTOMY: CPT

## 2024-07-23 PROCEDURE — 82728 ASSAY OF FERRITIN: CPT | Mod: WESLAB | Performed by: NURSE PRACTITIONER

## 2024-07-23 PROCEDURE — 85025 COMPLETE CBC W/AUTO DIFF WBC: CPT | Performed by: NURSE PRACTITIONER

## 2024-07-23 RX ORDER — ALBUTEROL SULFATE 0.83 MG/ML
3 SOLUTION RESPIRATORY (INHALATION) AS NEEDED
OUTPATIENT
Start: 2024-09-16

## 2024-07-23 RX ORDER — FAMOTIDINE 10 MG/ML
20 INJECTION INTRAVENOUS ONCE AS NEEDED
OUTPATIENT
Start: 2024-09-16

## 2024-07-23 RX ORDER — EPINEPHRINE 0.3 MG/.3ML
0.3 INJECTION SUBCUTANEOUS EVERY 5 MIN PRN
OUTPATIENT
Start: 2024-09-16

## 2024-07-23 RX ORDER — DIPHENHYDRAMINE HYDROCHLORIDE 50 MG/ML
50 INJECTION INTRAMUSCULAR; INTRAVENOUS AS NEEDED
OUTPATIENT
Start: 2024-09-16

## 2024-07-23 ASSESSMENT — PAIN SCALES - GENERAL: PAINLEVEL: 0-NO PAIN

## 2024-07-23 NOTE — PROGRESS NOTES
Pt tolerated phlebotomy well today. Drank gingerale & ate oreos during. States he feels well with no questions or complaints upon discharge. Ambulates independently to leave.

## 2024-09-16 DIAGNOSIS — E83.110 HEREDITARY HEMOCHROMATOSIS (CMS-HCC): Primary | ICD-10-CM

## 2024-09-17 ENCOUNTER — APPOINTMENT (OUTPATIENT)
Dept: HEMATOLOGY/ONCOLOGY | Facility: CLINIC | Age: 69
End: 2024-09-17
Payer: MEDICARE

## 2024-09-17 ENCOUNTER — INFUSION (OUTPATIENT)
Dept: HEMATOLOGY/ONCOLOGY | Facility: CLINIC | Age: 69
End: 2024-09-17
Payer: MEDICARE

## 2024-09-17 VITALS
RESPIRATION RATE: 17 BRPM | SYSTOLIC BLOOD PRESSURE: 123 MMHG | BODY MASS INDEX: 29.12 KG/M2 | DIASTOLIC BLOOD PRESSURE: 82 MMHG | TEMPERATURE: 97.3 F | OXYGEN SATURATION: 96 % | HEART RATE: 70 BPM | WEIGHT: 202.93 LBS

## 2024-09-17 DIAGNOSIS — E83.110 HEREDITARY HEMOCHROMATOSIS (CMS-HCC): ICD-10-CM

## 2024-09-17 LAB
BASOPHILS # BLD AUTO: 0.05 X10*3/UL (ref 0–0.1)
BASOPHILS NFR BLD AUTO: 0.7 %
EOSINOPHIL # BLD AUTO: 0.08 X10*3/UL (ref 0–0.7)
EOSINOPHIL NFR BLD AUTO: 1.1 %
ERYTHROCYTE [DISTWIDTH] IN BLOOD BY AUTOMATED COUNT: 13 % (ref 11.5–14.5)
FERRITIN SERPL-MCNC: 55 NG/ML (ref 20–300)
HCT VFR BLD AUTO: 46.4 % (ref 41–52)
HGB BLD-MCNC: 16 G/DL (ref 13.5–17.5)
IMM GRANULOCYTES # BLD AUTO: 0.01 X10*3/UL (ref 0–0.7)
IMM GRANULOCYTES NFR BLD AUTO: 0.1 % (ref 0–0.9)
LYMPHOCYTES # BLD AUTO: 1.9 X10*3/UL (ref 1.2–4.8)
LYMPHOCYTES NFR BLD AUTO: 26.3 %
MCH RBC QN AUTO: 33.1 PG (ref 26–34)
MCHC RBC AUTO-ENTMCNC: 34.5 G/DL (ref 32–36)
MCV RBC AUTO: 96 FL (ref 80–100)
MONOCYTES # BLD AUTO: 0.49 X10*3/UL (ref 0.1–1)
MONOCYTES NFR BLD AUTO: 6.8 %
NEUTROPHILS # BLD AUTO: 4.69 X10*3/UL (ref 1.2–7.7)
NEUTROPHILS NFR BLD AUTO: 65 %
NRBC BLD-RTO: 0 /100 WBCS (ref 0–0)
PLATELET # BLD AUTO: 177 X10*3/UL (ref 150–450)
RBC # BLD AUTO: 4.84 X10*6/UL (ref 4.5–5.9)
WBC # BLD AUTO: 7.2 X10*3/UL (ref 4.4–11.3)

## 2024-09-17 PROCEDURE — 82728 ASSAY OF FERRITIN: CPT | Mod: WESLAB | Performed by: NURSE PRACTITIONER

## 2024-09-17 PROCEDURE — 85025 COMPLETE CBC W/AUTO DIFF WBC: CPT | Performed by: NURSE PRACTITIONER

## 2024-09-17 PROCEDURE — 99195 PHLEBOTOMY: CPT

## 2024-09-17 RX ORDER — ALBUTEROL SULFATE 0.83 MG/ML
3 SOLUTION RESPIRATORY (INHALATION) AS NEEDED
Status: DISCONTINUED | OUTPATIENT
Start: 2024-09-17 | End: 2024-09-17 | Stop reason: HOSPADM

## 2024-09-17 RX ORDER — DIPHENHYDRAMINE HYDROCHLORIDE 50 MG/ML
50 INJECTION INTRAMUSCULAR; INTRAVENOUS AS NEEDED
OUTPATIENT
Start: 2024-11-12

## 2024-09-17 RX ORDER — FAMOTIDINE 10 MG/ML
20 INJECTION INTRAVENOUS ONCE AS NEEDED
Status: DISCONTINUED | OUTPATIENT
Start: 2024-09-17 | End: 2024-09-17 | Stop reason: HOSPADM

## 2024-09-17 RX ORDER — EPINEPHRINE 0.3 MG/.3ML
0.3 INJECTION SUBCUTANEOUS EVERY 5 MIN PRN
Status: DISCONTINUED | OUTPATIENT
Start: 2024-09-17 | End: 2024-09-17 | Stop reason: HOSPADM

## 2024-09-17 RX ORDER — FAMOTIDINE 10 MG/ML
20 INJECTION INTRAVENOUS ONCE AS NEEDED
OUTPATIENT
Start: 2024-11-12

## 2024-09-17 RX ORDER — ALBUTEROL SULFATE 0.83 MG/ML
3 SOLUTION RESPIRATORY (INHALATION) AS NEEDED
OUTPATIENT
Start: 2024-11-12

## 2024-09-17 RX ORDER — EPINEPHRINE 0.3 MG/.3ML
0.3 INJECTION SUBCUTANEOUS EVERY 5 MIN PRN
OUTPATIENT
Start: 2024-11-12

## 2024-09-17 RX ORDER — DIPHENHYDRAMINE HYDROCHLORIDE 50 MG/ML
50 INJECTION INTRAMUSCULAR; INTRAVENOUS AS NEEDED
Status: DISCONTINUED | OUTPATIENT
Start: 2024-09-17 | End: 2024-09-17 | Stop reason: HOSPADM

## 2024-09-17 ASSESSMENT — PAIN SCALES - GENERAL
PAINLEVEL_OUTOF10: 0-NO PAIN
PAINLEVEL: 0-NO PAIN

## 2024-09-17 NOTE — PROGRESS NOTES
Pt arrived ambulatory for phlebotomy. Pt denies any new or worsening symptoms. PIV established and labs sent.   Pt tolerated phlebotomy well. VSS after observation. Pt denied any lightheadedness or dizziness and stated that his post bp is his baseline and that he feels good to leave facility. PIV removed and bandaged. Pt given AVS. Pt left ambulatory.

## 2024-11-12 ENCOUNTER — APPOINTMENT (OUTPATIENT)
Dept: HEMATOLOGY/ONCOLOGY | Facility: CLINIC | Age: 69
End: 2024-11-12
Payer: MEDICARE

## 2024-11-19 ENCOUNTER — INFUSION (OUTPATIENT)
Dept: HEMATOLOGY/ONCOLOGY | Facility: CLINIC | Age: 69
End: 2024-11-19
Payer: MEDICARE

## 2024-11-19 VITALS
DIASTOLIC BLOOD PRESSURE: 97 MMHG | SYSTOLIC BLOOD PRESSURE: 164 MMHG | RESPIRATION RATE: 18 BRPM | HEART RATE: 63 BPM | BODY MASS INDEX: 30.56 KG/M2 | WEIGHT: 212.96 LBS | TEMPERATURE: 96.6 F | OXYGEN SATURATION: 97 %

## 2024-11-19 DIAGNOSIS — E83.110 HEREDITARY HEMOCHROMATOSIS (CMS-HCC): ICD-10-CM

## 2024-11-19 DIAGNOSIS — E83.110 HEREDITARY HEMOCHROMATOSIS (CMS-HCC): Primary | ICD-10-CM

## 2024-11-19 LAB
ALBUMIN SERPL BCP-MCNC: 4.7 G/DL (ref 3.4–5)
ALP SERPL-CCNC: 51 U/L (ref 33–136)
ALT SERPL W P-5'-P-CCNC: 15 U/L (ref 10–52)
ANION GAP SERPL CALC-SCNC: 14 MMOL/L (ref 10–20)
AST SERPL W P-5'-P-CCNC: 22 U/L (ref 9–39)
BASOPHILS # BLD AUTO: 0.06 X10*3/UL (ref 0–0.1)
BASOPHILS NFR BLD AUTO: 1.1 %
BILIRUB DIRECT SERPL-MCNC: 0.2 MG/DL (ref 0–0.3)
BILIRUB SERPL-MCNC: 1 MG/DL (ref 0–1.2)
BUN SERPL-MCNC: 18 MG/DL (ref 6–23)
CALCIUM SERPL-MCNC: 9.6 MG/DL (ref 8.6–10.3)
CHLORIDE SERPL-SCNC: 107 MMOL/L (ref 98–107)
CO2 SERPL-SCNC: 25 MMOL/L (ref 21–32)
CREAT SERPL-MCNC: 1.16 MG/DL (ref 0.5–1.3)
EGFRCR SERPLBLD CKD-EPI 2021: 69 ML/MIN/1.73M*2
EOSINOPHIL # BLD AUTO: 0.1 X10*3/UL (ref 0–0.7)
EOSINOPHIL NFR BLD AUTO: 1.9 %
ERYTHROCYTE [DISTWIDTH] IN BLOOD BY AUTOMATED COUNT: 12.7 % (ref 11.5–14.5)
FERRITIN SERPL-MCNC: 43 NG/ML (ref 20–300)
GLUCOSE SERPL-MCNC: 103 MG/DL (ref 74–99)
HCT VFR BLD AUTO: 46.4 % (ref 41–52)
HGB BLD-MCNC: 15.8 G/DL (ref 13.5–17.5)
IMM GRANULOCYTES # BLD AUTO: 0.01 X10*3/UL (ref 0–0.7)
IMM GRANULOCYTES NFR BLD AUTO: 0.2 % (ref 0–0.9)
IRON SATN MFR SERPL: 29 % (ref 25–45)
IRON SERPL-MCNC: 132 UG/DL (ref 35–150)
LYMPHOCYTES # BLD AUTO: 1.42 X10*3/UL (ref 1.2–4.8)
LYMPHOCYTES NFR BLD AUTO: 26.9 %
MCH RBC QN AUTO: 32.2 PG (ref 26–34)
MCHC RBC AUTO-ENTMCNC: 34.1 G/DL (ref 32–36)
MCV RBC AUTO: 95 FL (ref 80–100)
MONOCYTES # BLD AUTO: 0.49 X10*3/UL (ref 0.1–1)
MONOCYTES NFR BLD AUTO: 9.3 %
NEUTROPHILS # BLD AUTO: 3.2 X10*3/UL (ref 1.2–7.7)
NEUTROPHILS NFR BLD AUTO: 60.6 %
NRBC BLD-RTO: 0 /100 WBCS (ref 0–0)
PLATELET # BLD AUTO: 150 X10*3/UL (ref 150–450)
POTASSIUM SERPL-SCNC: 4.6 MMOL/L (ref 3.5–5.3)
PROT SERPL-MCNC: 7.4 G/DL (ref 6.4–8.2)
RBC # BLD AUTO: 4.91 X10*6/UL (ref 4.5–5.9)
SODIUM SERPL-SCNC: 141 MMOL/L (ref 136–145)
TIBC SERPL-MCNC: 452 UG/DL (ref 240–445)
UIBC SERPL-MCNC: 320 UG/DL (ref 110–370)
WBC # BLD AUTO: 5.3 X10*3/UL (ref 4.4–11.3)

## 2024-11-19 PROCEDURE — 80048 BASIC METABOLIC PNL TOTAL CA: CPT

## 2024-11-19 PROCEDURE — 82728 ASSAY OF FERRITIN: CPT

## 2024-11-19 PROCEDURE — 83550 IRON BINDING TEST: CPT

## 2024-11-19 PROCEDURE — 85025 COMPLETE CBC W/AUTO DIFF WBC: CPT

## 2024-11-19 PROCEDURE — 82248 BILIRUBIN DIRECT: CPT

## 2024-11-19 PROCEDURE — 99195 PHLEBOTOMY: CPT

## 2024-11-19 RX ORDER — FAMOTIDINE 10 MG/ML
20 INJECTION INTRAVENOUS ONCE AS NEEDED
Status: DISCONTINUED | OUTPATIENT
Start: 2024-11-19 | End: 2024-11-19 | Stop reason: HOSPADM

## 2024-11-19 RX ORDER — ALBUTEROL SULFATE 0.83 MG/ML
3 SOLUTION RESPIRATORY (INHALATION) AS NEEDED
OUTPATIENT
Start: 2025-01-07

## 2024-11-19 RX ORDER — ALBUTEROL SULFATE 0.83 MG/ML
3 SOLUTION RESPIRATORY (INHALATION) AS NEEDED
Status: DISCONTINUED | OUTPATIENT
Start: 2024-11-19 | End: 2024-11-19 | Stop reason: HOSPADM

## 2024-11-19 RX ORDER — EPINEPHRINE 0.3 MG/.3ML
0.3 INJECTION SUBCUTANEOUS EVERY 5 MIN PRN
OUTPATIENT
Start: 2025-01-07

## 2024-11-19 RX ORDER — FAMOTIDINE 10 MG/ML
20 INJECTION INTRAVENOUS ONCE AS NEEDED
Status: CANCELLED | OUTPATIENT
Start: 2024-11-19

## 2024-11-19 RX ORDER — EPINEPHRINE 0.3 MG/.3ML
0.3 INJECTION SUBCUTANEOUS EVERY 5 MIN PRN
Status: DISCONTINUED | OUTPATIENT
Start: 2024-11-19 | End: 2024-11-19 | Stop reason: HOSPADM

## 2024-11-19 RX ORDER — DIPHENHYDRAMINE HYDROCHLORIDE 50 MG/ML
50 INJECTION INTRAMUSCULAR; INTRAVENOUS AS NEEDED
OUTPATIENT
Start: 2025-01-07

## 2024-11-19 RX ORDER — DIPHENHYDRAMINE HYDROCHLORIDE 50 MG/ML
50 INJECTION INTRAMUSCULAR; INTRAVENOUS AS NEEDED
Status: CANCELLED | OUTPATIENT
Start: 2024-11-19

## 2024-11-19 RX ORDER — FAMOTIDINE 10 MG/ML
20 INJECTION INTRAVENOUS ONCE AS NEEDED
OUTPATIENT
Start: 2025-01-07

## 2024-11-19 RX ORDER — DIPHENHYDRAMINE HYDROCHLORIDE 50 MG/ML
50 INJECTION INTRAMUSCULAR; INTRAVENOUS AS NEEDED
Status: DISCONTINUED | OUTPATIENT
Start: 2024-11-19 | End: 2024-11-19 | Stop reason: HOSPADM

## 2024-11-19 RX ORDER — ALBUTEROL SULFATE 0.83 MG/ML
3 SOLUTION RESPIRATORY (INHALATION) AS NEEDED
Status: CANCELLED | OUTPATIENT
Start: 2024-11-19

## 2024-11-19 RX ORDER — EPINEPHRINE 0.3 MG/.3ML
0.3 INJECTION SUBCUTANEOUS EVERY 5 MIN PRN
Status: CANCELLED | OUTPATIENT
Start: 2024-11-19

## 2024-11-19 ASSESSMENT — PAIN SCALES - GENERAL: PAINLEVEL_OUTOF10: 0-NO PAIN

## 2024-12-10 ENCOUNTER — OFFICE VISIT (OUTPATIENT)
Dept: HEMATOLOGY/ONCOLOGY | Facility: HOSPITAL | Age: 69
End: 2024-12-10
Payer: MEDICARE

## 2024-12-10 ENCOUNTER — APPOINTMENT (OUTPATIENT)
Dept: HEMATOLOGY/ONCOLOGY | Facility: CLINIC | Age: 69
End: 2024-12-10
Payer: MEDICARE

## 2024-12-10 VITALS
HEIGHT: 70 IN | BODY MASS INDEX: 30.16 KG/M2 | OXYGEN SATURATION: 96 % | TEMPERATURE: 96.8 F | DIASTOLIC BLOOD PRESSURE: 84 MMHG | WEIGHT: 210.65 LBS | SYSTOLIC BLOOD PRESSURE: 137 MMHG | HEART RATE: 71 BPM | RESPIRATION RATE: 16 BRPM

## 2024-12-10 DIAGNOSIS — E83.110 HEREDITARY HEMOCHROMATOSIS (CMS-HCC): Primary | ICD-10-CM

## 2024-12-10 PROCEDURE — 99213 OFFICE O/P EST LOW 20 MIN: CPT

## 2024-12-10 PROCEDURE — 3075F SYST BP GE 130 - 139MM HG: CPT

## 2024-12-10 PROCEDURE — 3008F BODY MASS INDEX DOCD: CPT

## 2024-12-10 PROCEDURE — 3079F DIAST BP 80-89 MM HG: CPT

## 2024-12-10 PROCEDURE — 1159F MED LIST DOCD IN RCRD: CPT

## 2024-12-10 PROCEDURE — 1160F RVW MEDS BY RX/DR IN RCRD: CPT

## 2024-12-10 PROCEDURE — 99203 OFFICE O/P NEW LOW 30 MIN: CPT

## 2024-12-10 PROCEDURE — 1126F AMNT PAIN NOTED NONE PRSNT: CPT

## 2024-12-10 RX ORDER — DIPHENHYDRAMINE HYDROCHLORIDE 50 MG/ML
50 INJECTION INTRAMUSCULAR; INTRAVENOUS AS NEEDED
OUTPATIENT
Start: 2024-12-10

## 2024-12-10 RX ORDER — EPINEPHRINE 0.3 MG/.3ML
0.3 INJECTION SUBCUTANEOUS EVERY 5 MIN PRN
OUTPATIENT
Start: 2024-12-10

## 2024-12-10 RX ORDER — FAMOTIDINE 10 MG/ML
20 INJECTION INTRAVENOUS ONCE AS NEEDED
OUTPATIENT
Start: 2024-12-10

## 2024-12-10 RX ORDER — ALBUTEROL SULFATE 0.83 MG/ML
3 SOLUTION RESPIRATORY (INHALATION) AS NEEDED
OUTPATIENT
Start: 2024-12-10

## 2024-12-10 ASSESSMENT — LIFESTYLE VARIABLES
SKIP TO QUESTIONS 9-10: 1
AUDIT-C TOTAL SCORE: 0
HOW MANY STANDARD DRINKS CONTAINING ALCOHOL DO YOU HAVE ON A TYPICAL DAY: PATIENT DOES NOT DRINK
HOW OFTEN DO YOU HAVE A DRINK CONTAINING ALCOHOL: NEVER
HOW OFTEN DO YOU HAVE SIX OR MORE DRINKS ON ONE OCCASION: NEVER

## 2024-12-10 ASSESSMENT — ENCOUNTER SYMPTOMS
DEPRESSION: 0
OCCASIONAL FEELINGS OF UNSTEADINESS: 0
LOSS OF SENSATION IN FEET: 1

## 2024-12-10 ASSESSMENT — PAIN SCALES - GENERAL: PAINLEVEL_OUTOF10: 0-NO PAIN

## 2024-12-10 NOTE — PROGRESS NOTES
"[facility]    PATIENT VISIT INFORMATION    Visit Type: Follow up new provider    Referring Provider: DELIA Salvador-CNP  Reason for referral: Heterozygosity for genes, C282Y and H63D Compound   Primary Practice Provider:  Reg Perrin MD    HEMATOLOGY HISTORY    The patient comes in today for routine annual followup visit for history of hemochromatosis, compound heterozygosity for genes, C282Y and H63D. He continues with phlebotomies every two months. Following with Dr. French in GI last ultrasound of the liver completed in November 2022.   He follows cardiology annually for monitoring of his heart.    Has met with Dr. France for planter fasciitis and is scheduled for surgery on December 18, 2023.  He is also following with vascular for varicose vein injections.  Heterozygosity for genes, C282Y and H63D Compound.  HISTORY OF PRESENT ILLNESS     ID Statement: Juan Miller is a 69 year old male     Chief Complaint: \"Feel great\"    Interval History:   On presentation today he denies any fevers, chills or night sweats. No cough, chest pain or shortness of breath. No nausea or vomiting. No diarrhea or constipation. No interval infections or hospitalizations.  He tolerates his phlebotomies without significant side effects.   Neuropathy in feet. Plantar Fascitis.     PAST/CURRENT HISTORY     MEDICAL/SURGICAL HISTORY  -Hemochromatosis compound  -left foot plantar fascitis  2024  -Compound heterozygosity for hemochromatosis gene, C282Y and H63D, cirrhosis of the liver, chronic hepatitis C infection, genotype II in relapse after initial remission after interferon therapy, kidney stones, status post treatment for his hepatitis C infection in the spring of 2017, status post shingles in the T3 dermatome.   -knee surgeries x3, one skin grafting, facial bone grafting, liver biopsies, status post lithotripsy with ureteral stent placement in October 2019 through January 2020 with " four separate procedures.     SOCIAL HISTORY  -Lives wife   -Work place: Retired  [employer]    -Tobacco/smokeless use: Denies   -Alcohol: Denies now HX ETOH  -Illicit drug or marijuana use: Denies   -Latter-day or Spiritual beliefs: Hindu   -Social Determinates of Health Concerns: NA    FAMILY HISTORY  -Mother hemochromatosis, Emphysema   -Dad hemochromatosis, MI, DM  -Siblings with hemachromatosis   -No other known history of hematologic, bleeding, clotting, autoimmune, genetic, or malignant disorders in the family.     OCCUPATIONAL/ENVIRONMENTAL HISTORY/EXPOSURES:  -None reported    Active Problems, Allergy List, Medication List, and PMH/PSH/FH/Social Hx have been reviewed and reconciled in chart. Updates made when necessary.     REVIEW OF SYSTEMS   A review of systems has been completed and are negative for complaints except what is stated in the assessment, HPI, IH, ROS, and/or past medical history.    ALLERGIES AND MEDICATIONS     Allergies and Intolerances:   Allergies   Allergen Reactions    Oxycodone Shortness of breath      Medication Profile:   Current Outpatient Medications   Medication Instructions    ascorbic acid (VITAMIN C) 500 mg, Daily PRN    cholecalciferol, vitamin D3, (VITAMIN D3 ORAL) 1 capsule, Daily    ibuprofen 400 mg, Every 6 hours PRN    lisinopril 20 mg, oral, Daily      Available Vaccination Record:   Immunization History   Administered Date(s) Administered    COVID-19, mRNA, LNP-S, PF, 30 mcg/0.3 mL dose 05/14/2021, 12/20/2021    Flu vaccine, trivalent, preservative free, HIGH-DOSE, age 65y+ (Fluzone) 11/18/2021    Influenza, injectable, quadrivalent 10/08/2020    Influenza, seasonal, injectable 11/12/2012    Influenza, seasonal, intradermal, preservative free 12/19/2016    Pfizer Purple Cap SARS-CoV-2 06/01/2021, 06/22/2021    Pneumococcal conjugate vaccine, 20-valent (PREVNAR 20) 05/08/2024      PHYSICAL EXAM     Vital Signs/Measurements:     Performance:    ECOG Performance Status: 0     Grade ECOG performance status   0 Fully active, able to carry on all pre-disease performance without restriction   1 Restricted in physically strenuous activity but ambulatory and able to carry out work of a light or sedentary nature, e.g., light housework, office work   2 Ambulatory and capable of all selfcare but unable to carry out any work activities; Up and about more than 50% of waking hours   3 Capable of only limited selfcare, confined to bed or chair more than 50% of waking hours   4 Completely disabled; Cannot carry out any selfcare; Totally confined to bed or chair   5 Dead     Physical Exam:  General: Patient is awake/alert/oriented x3, no distress, nourished, hydrated, and cooperative, ambulating without difficulty  Skin: Expected color for ethnicity, good turgor, dry, no prominent lesions, rashes, unusual bruising, or bleeding   Hair: Normal texture and distribution   Nails: Normal color, no deformities    HEENT:   Head: Normocephalic, atraumatic, no visible masses  Eyes: Conjunctiva clear, sclera non-icteric, no exudates or hemorrhages   Ears: nl appearance, hearing intact    Nose: no external lesions, mucosa non-inflamed, no rhinorrhea   Mouth: Mucous membranes moist, no lesions, sores, bleeding, or erythema     Head/Neck: Neck supple, no apparent injury, thyroid without mass or tenderness, No JVD, trachea midline, no bruits appreciated    Respiratory/Thorax: Patent airways, CTAB, chest symmetry, normal inspiratory and expiratory effort    Cardiovascular: Regular rate and rhythm, no murmur or gallop, no carotid bruit or thrills    Gastrointestinal: Bowel sounds normal, non-distended, soft, no tenderness, no masses or hernia, or organomegaly appreciated    Genitourinary: Deferred   Musculoskeletal: Normal gait, normal range of motion, no pain on palpation of spine, no deformity, normal strength, no atrophy, and no CVA tenderness appreciated   Extremities: No  amputations or deformities, cyanosis, edema or viscosities, peripheral pulses intact   Neurological: Sensation present to touch, intact senses, motor response and reflexes normal, normal strength   Breast:    Lymphatic: No significant lymphadenopathy   Psychological: Intact recent and remote memory, judgement, and insight. Appropriate mood, affect, and behavior          RESULTS/DATA     Labs:     Radiology/Studies:   Please see above    ASSESSMENT/PLAN     Assessment and Plan:   #1. Hemochromatosis:   This is a 68-year-old gentleman with a compound heterozygosity for the hemochromatosis HFE gene locus with cirrhosis of the liver likely secondary to hemochromatosis, alcohol and hepatitis C, currently well-controlled with phlebotomies. No current alcohol use.      ~The patient continues with phlebotomies once every eight weeks.  Goal Ferritin <50 and HGB >11.5    # 2. Liver:   His liver ultrasounds are due every three years; next due in November 2025.  Follows with Dr. French.     # 3. Hepatitis C:   This remains in complete remission.     ~Provider visit annually   - phlebotomy every 2 months, tried 3 months and it is not frequent enough.   - labs.   - - CBCd and ferritin each phlebotomy  - - CMP, iron panel every 6 months (December and June)    DISCLAIMER:   In preparing for this visit and writing this note, I reviewed all the previous electronic medical records (testing, labs, imaging, and other procedures, provider notes, and medical charts) of the patient available in the physician portal pertinent to patient care. Significant findings which helped in decision making are recorded in this chart.     **Please follow with specialties as scheduled for other comorbidities and routine health screenings.**    Follow up:    RTC:  -1 year follow up  -Labs every 6 months     Medications:  -NA    Imaging/Testing:  -na    Referral(s):  -NA    Other Pertinent Appointments:  -NA      Patient Discussion Summary:  Discussed  plan of care in detail. Patient states understanding and in agreement to the plan. Answered all questions to there liking. The patient will call with any additional questions and/or concerns.    Thank you for allowing me to participate in your care. It was a pleasure meeting you.    Sincerely,  Denise Fagan, APRN-CNP       This document may have been written by voice recognition software.  There may be some incorrect wording, spelling and/or spelling errors or punctuation errors that were not corrected prior to committing the note to the medical record. Please request clarification if there is documentation error or clarification is needed.   Time based billing: Please see documentation within this specific encounter.

## 2025-01-22 DIAGNOSIS — E83.110 HEREDITARY HEMOCHROMATOSIS (CMS-HCC): Primary | ICD-10-CM

## 2025-01-22 DIAGNOSIS — E83.110 HEREDITARY HEMOCHROMATOSIS (CMS-HCC): ICD-10-CM

## 2025-01-23 ENCOUNTER — INFUSION (OUTPATIENT)
Dept: HEMATOLOGY/ONCOLOGY | Facility: HOSPITAL | Age: 70
End: 2025-01-23
Payer: MEDICARE

## 2025-01-23 VITALS
HEART RATE: 82 BPM | WEIGHT: 212.74 LBS | BODY MASS INDEX: 30.46 KG/M2 | RESPIRATION RATE: 16 BRPM | TEMPERATURE: 96.6 F | OXYGEN SATURATION: 96 % | SYSTOLIC BLOOD PRESSURE: 159 MMHG | DIASTOLIC BLOOD PRESSURE: 81 MMHG | HEIGHT: 70 IN

## 2025-01-23 DIAGNOSIS — E83.110 HEREDITARY HEMOCHROMATOSIS (CMS-HCC): ICD-10-CM

## 2025-01-23 LAB
BASOPHILS # BLD AUTO: 0.05 X10*3/UL (ref 0–0.1)
BASOPHILS NFR BLD AUTO: 0.9 %
EOSINOPHIL # BLD AUTO: 0.07 X10*3/UL (ref 0–0.7)
EOSINOPHIL NFR BLD AUTO: 1.2 %
ERYTHROCYTE [DISTWIDTH] IN BLOOD BY AUTOMATED COUNT: 13.1 % (ref 11.5–14.5)
FERRITIN SERPL-MCNC: 48 NG/ML (ref 20–300)
HCT VFR BLD AUTO: 45.7 % (ref 41–52)
HGB BLD-MCNC: 15.4 G/DL (ref 13.5–17.5)
IMM GRANULOCYTES # BLD AUTO: 0.01 X10*3/UL (ref 0–0.7)
IMM GRANULOCYTES NFR BLD AUTO: 0.2 % (ref 0–0.9)
IRON SATN MFR SERPL: 40 % (ref 25–45)
IRON SERPL-MCNC: 168 UG/DL (ref 35–150)
LYMPHOCYTES # BLD AUTO: 1.31 X10*3/UL (ref 1.2–4.8)
LYMPHOCYTES NFR BLD AUTO: 22.3 %
MCH RBC QN AUTO: 31.4 PG (ref 26–34)
MCHC RBC AUTO-ENTMCNC: 33.7 G/DL (ref 32–36)
MCV RBC AUTO: 93 FL (ref 80–100)
MONOCYTES # BLD AUTO: 0.36 X10*3/UL (ref 0.1–1)
MONOCYTES NFR BLD AUTO: 6.1 %
NEUTROPHILS # BLD AUTO: 4.08 X10*3/UL (ref 1.2–7.7)
NEUTROPHILS NFR BLD AUTO: 69.3 %
NRBC BLD-RTO: 0 /100 WBCS (ref 0–0)
PLATELET # BLD AUTO: 166 X10*3/UL (ref 150–450)
RBC # BLD AUTO: 4.91 X10*6/UL (ref 4.5–5.9)
RBC MORPH BLD: NORMAL
TIBC SERPL-MCNC: 421 UG/DL (ref 240–445)
UIBC SERPL-MCNC: 253 UG/DL (ref 110–370)
WBC # BLD AUTO: 5.9 X10*3/UL (ref 4.4–11.3)

## 2025-01-23 PROCEDURE — 83550 IRON BINDING TEST: CPT

## 2025-01-23 PROCEDURE — 85025 COMPLETE CBC W/AUTO DIFF WBC: CPT

## 2025-01-23 PROCEDURE — 36415 COLL VENOUS BLD VENIPUNCTURE: CPT

## 2025-01-23 PROCEDURE — 82728 ASSAY OF FERRITIN: CPT

## 2025-01-23 ASSESSMENT — PATIENT HEALTH QUESTIONNAIRE - PHQ9
1. LITTLE INTEREST OR PLEASURE IN DOING THINGS: NOT AT ALL
SUM OF ALL RESPONSES TO PHQ9 QUESTIONS 1 & 2: 0
2. FEELING DOWN, DEPRESSED OR HOPELESS: NOT AT ALL

## 2025-01-23 ASSESSMENT — PAIN SCALES - GENERAL: PAINLEVEL_OUTOF10: 0-NO PAIN

## 2025-01-23 NOTE — PROGRESS NOTES
Pt here for phlebotomy. Labs drawn prior to procedure and Ferritin=48, Hgb=15.4. Pt does not meet criteria to treat today. Pt verbalized understanding of parameters to treat, labs printed for pt along with AVS with future appointment schedule. Pt denies any additional questions at this time.

## 2025-03-19 DIAGNOSIS — E83.119 HEMOCHROMATOSIS, UNSPECIFIED HEMOCHROMATOSIS TYPE: ICD-10-CM

## 2025-03-20 ENCOUNTER — INFUSION (OUTPATIENT)
Dept: HEMATOLOGY/ONCOLOGY | Facility: HOSPITAL | Age: 70
End: 2025-03-20
Payer: MEDICARE

## 2025-03-20 VITALS
SYSTOLIC BLOOD PRESSURE: 125 MMHG | OXYGEN SATURATION: 97 % | DIASTOLIC BLOOD PRESSURE: 83 MMHG | HEART RATE: 63 BPM | RESPIRATION RATE: 16 BRPM | TEMPERATURE: 96.8 F

## 2025-03-20 DIAGNOSIS — E83.110 HEREDITARY HEMOCHROMATOSIS (CMS-HCC): ICD-10-CM

## 2025-03-20 DIAGNOSIS — E83.119 HEMOCHROMATOSIS, UNSPECIFIED HEMOCHROMATOSIS TYPE: ICD-10-CM

## 2025-03-20 LAB
ALBUMIN SERPL BCP-MCNC: 4.2 G/DL (ref 3.4–5)
ALP SERPL-CCNC: 47 U/L (ref 33–136)
ALT SERPL W P-5'-P-CCNC: 24 U/L (ref 10–52)
ANION GAP SERPL CALC-SCNC: 13 MMOL/L (ref 10–20)
AST SERPL W P-5'-P-CCNC: 25 U/L (ref 9–39)
BASOPHILS # BLD AUTO: 0.04 X10*3/UL (ref 0–0.1)
BASOPHILS NFR BLD AUTO: 0.8 %
BILIRUB DIRECT SERPL-MCNC: 0.2 MG/DL (ref 0–0.3)
BILIRUB SERPL-MCNC: 1.1 MG/DL (ref 0–1.2)
BUN SERPL-MCNC: 20 MG/DL (ref 6–23)
CALCIUM SERPL-MCNC: 9.6 MG/DL (ref 8.6–10.3)
CHLORIDE SERPL-SCNC: 106 MMOL/L (ref 98–107)
CO2 SERPL-SCNC: 23 MMOL/L (ref 21–32)
CREAT SERPL-MCNC: 1.28 MG/DL (ref 0.5–1.3)
EGFRCR SERPLBLD CKD-EPI 2021: 61 ML/MIN/1.73M*2
EOSINOPHIL # BLD AUTO: 0.12 X10*3/UL (ref 0–0.7)
EOSINOPHIL NFR BLD AUTO: 2.3 %
ERYTHROCYTE [DISTWIDTH] IN BLOOD BY AUTOMATED COUNT: 13.9 % (ref 11.5–14.5)
FERRITIN SERPL-MCNC: 55 NG/ML (ref 20–300)
GLUCOSE SERPL-MCNC: 100 MG/DL (ref 74–99)
HCT VFR BLD AUTO: 47.5 % (ref 41–52)
HGB BLD-MCNC: 15.8 G/DL (ref 13.5–17.5)
IMM GRANULOCYTES # BLD AUTO: 0.04 X10*3/UL (ref 0–0.7)
IMM GRANULOCYTES NFR BLD AUTO: 0.8 % (ref 0–0.9)
IRON SATN MFR SERPL: 48 % (ref 25–45)
IRON SERPL-MCNC: 194 UG/DL (ref 35–150)
LYMPHOCYTES # BLD AUTO: 1.62 X10*3/UL (ref 1.2–4.8)
LYMPHOCYTES NFR BLD AUTO: 31.7 %
MCH RBC QN AUTO: 31.8 PG (ref 26–34)
MCHC RBC AUTO-ENTMCNC: 33.3 G/DL (ref 32–36)
MCV RBC AUTO: 96 FL (ref 80–100)
MONOCYTES # BLD AUTO: 0.5 X10*3/UL (ref 0.1–1)
MONOCYTES NFR BLD AUTO: 9.8 %
NEUTROPHILS # BLD AUTO: 2.79 X10*3/UL (ref 1.2–7.7)
NEUTROPHILS NFR BLD AUTO: 54.6 %
NRBC BLD-RTO: 0 /100 WBCS (ref 0–0)
PLATELET # BLD AUTO: 177 X10*3/UL (ref 150–450)
POTASSIUM SERPL-SCNC: 4.4 MMOL/L (ref 3.5–5.3)
PROT SERPL-MCNC: 7.2 G/DL (ref 6.4–8.2)
RBC # BLD AUTO: 4.97 X10*6/UL (ref 4.5–5.9)
SODIUM SERPL-SCNC: 138 MMOL/L (ref 136–145)
TIBC SERPL-MCNC: 406 UG/DL (ref 240–445)
UIBC SERPL-MCNC: 212 UG/DL (ref 110–370)
WBC # BLD AUTO: 5.1 X10*3/UL (ref 4.4–11.3)

## 2025-03-20 PROCEDURE — 99195 PHLEBOTOMY: CPT

## 2025-03-20 PROCEDURE — 83540 ASSAY OF IRON: CPT

## 2025-03-20 PROCEDURE — 80053 COMPREHEN METABOLIC PANEL: CPT

## 2025-03-20 PROCEDURE — 36415 COLL VENOUS BLD VENIPUNCTURE: CPT

## 2025-03-20 PROCEDURE — 82728 ASSAY OF FERRITIN: CPT

## 2025-03-20 PROCEDURE — 82248 BILIRUBIN DIRECT: CPT

## 2025-03-20 PROCEDURE — 85025 COMPLETE CBC W/AUTO DIFF WBC: CPT

## 2025-03-20 RX ORDER — ALBUTEROL SULFATE 0.83 MG/ML
3 SOLUTION RESPIRATORY (INHALATION) AS NEEDED
Status: DISCONTINUED | OUTPATIENT
Start: 2025-03-20 | End: 2025-03-20 | Stop reason: HOSPADM

## 2025-03-20 RX ORDER — HEPARIN SODIUM,PORCINE/PF 10 UNIT/ML
50 SYRINGE (ML) INTRAVENOUS AS NEEDED
OUTPATIENT
Start: 2025-03-20

## 2025-03-20 RX ORDER — EPINEPHRINE 0.3 MG/.3ML
0.3 INJECTION SUBCUTANEOUS EVERY 5 MIN PRN
OUTPATIENT
Start: 2025-05-06

## 2025-03-20 RX ORDER — EPINEPHRINE 0.3 MG/.3ML
0.3 INJECTION SUBCUTANEOUS EVERY 5 MIN PRN
Status: DISCONTINUED | OUTPATIENT
Start: 2025-03-20 | End: 2025-03-20 | Stop reason: HOSPADM

## 2025-03-20 RX ORDER — DIPHENHYDRAMINE HYDROCHLORIDE 50 MG/ML
50 INJECTION, SOLUTION INTRAMUSCULAR; INTRAVENOUS AS NEEDED
OUTPATIENT
Start: 2025-05-06

## 2025-03-20 RX ORDER — ALBUTEROL SULFATE 0.83 MG/ML
3 SOLUTION RESPIRATORY (INHALATION) AS NEEDED
OUTPATIENT
Start: 2025-05-06

## 2025-03-20 RX ORDER — FAMOTIDINE 10 MG/ML
20 INJECTION, SOLUTION INTRAVENOUS ONCE AS NEEDED
Status: DISCONTINUED | OUTPATIENT
Start: 2025-03-20 | End: 2025-03-20 | Stop reason: HOSPADM

## 2025-03-20 RX ORDER — FAMOTIDINE 10 MG/ML
20 INJECTION, SOLUTION INTRAVENOUS ONCE AS NEEDED
OUTPATIENT
Start: 2025-05-06

## 2025-03-20 RX ORDER — DIPHENHYDRAMINE HYDROCHLORIDE 50 MG/ML
50 INJECTION, SOLUTION INTRAMUSCULAR; INTRAVENOUS AS NEEDED
Status: DISCONTINUED | OUTPATIENT
Start: 2025-03-20 | End: 2025-03-20 | Stop reason: HOSPADM

## 2025-03-20 RX ORDER — HEPARIN SODIUM,PORCINE/PF 10 UNIT/ML
50 SYRINGE (ML) INTRAVENOUS AS NEEDED
Status: DISCONTINUED | OUTPATIENT
Start: 2025-03-20 | End: 2025-03-20 | Stop reason: HOSPADM

## 2025-03-20 ASSESSMENT — PAIN SCALES - GENERAL
PAINLEVEL_OUTOF10: 0-NO PAIN
PAINLEVEL_OUTOF10: 0-NO PAIN

## 2025-03-20 ASSESSMENT — ENCOUNTER SYMPTOMS
OCCASIONAL FEELINGS OF UNSTEADINESS: 0
LOSS OF SENSATION IN FEET: 0
DEPRESSION: 0

## 2025-03-20 ASSESSMENT — PATIENT HEALTH QUESTIONNAIRE - PHQ9
SUM OF ALL RESPONSES TO PHQ9 QUESTIONS 1 & 2: 0
2. FEELING DOWN, DEPRESSED OR HOPELESS: NOT AT ALL
1. LITTLE INTEREST OR PLEASURE IN DOING THINGS: NOT AT ALL

## 2025-03-20 NOTE — PROGRESS NOTES
Therapeutic Phlebotomy Time-Out  Pre procedure Verification    Correct patient identity confirmed by 2 identifiers  Accurate written consent is completed (may be paper/media)  Procedure confirmed and consistent with consent form    Time out time 0913  Start time 0914      Signatures/date and time

## 2025-03-20 NOTE — PROGRESS NOTES
Scale calibration verified prior to phlebotomy (Scale serial #[id number]. Phlebotomized 500 ml blood without incident. Pt tolerated procedure well. No c/o dizziness or lightheadedness. Oral hydration during and post procedure.

## 2025-05-14 ENCOUNTER — TELEPHONE (OUTPATIENT)
Dept: HEMATOLOGY/ONCOLOGY | Facility: HOSPITAL | Age: 70
End: 2025-05-14
Payer: COMMERCIAL

## 2025-05-14 NOTE — TELEPHONE ENCOUNTER
----- Message from Nicol JORDAN sent at 5/14/2025  2:47 PM EDT -----  Regarding: RE: less than 7 days  I will call the patient. Thank you.  ----- Message -----  From: Hilda Zacarias  Sent: 5/14/2025   2:37 PM EDT  To: Malik Scheduling Error/Follow Up; [facility] [place]#  Subject: less than 7 days                                 Good afternoon, pt called in to oniel his appt tomorrow to a different day. Prefers mornings. Please advise thank you

## 2025-05-14 NOTE — TELEPHONE ENCOUNTER
Reason for Conversation  Appointment    Background   Patient was scheduled for Phleb infusion on Thursday, 5/15/25 at 8:00 AM. Received attached staff message from central scheduling stating patient reached out to reschedule. Informed central scheduling that I would contact the patient directly. Reached out to patient to assist with rescheduling. Patient requested an appointment for one day next week. Offered Monday, 5/19/25 or Thursday, 5/22/25. Patient agreed to Monday, 5/19/25 at 8:00 AM. Appointment rescheduled accordingly. Patient verbalized understanding and agreement regarding discussed information via verbal feedback.

## 2025-05-15 ENCOUNTER — APPOINTMENT (OUTPATIENT)
Dept: HEMATOLOGY/ONCOLOGY | Facility: HOSPITAL | Age: 70
End: 2025-05-15
Payer: MEDICARE

## 2025-05-19 ENCOUNTER — INFUSION (OUTPATIENT)
Dept: HEMATOLOGY/ONCOLOGY | Facility: HOSPITAL | Age: 70
End: 2025-05-19
Payer: MEDICARE

## 2025-05-19 VITALS
HEART RATE: 70 BPM | SYSTOLIC BLOOD PRESSURE: 151 MMHG | TEMPERATURE: 97 F | DIASTOLIC BLOOD PRESSURE: 82 MMHG | RESPIRATION RATE: 16 BRPM | OXYGEN SATURATION: 95 %

## 2025-05-19 DIAGNOSIS — E83.110 HEREDITARY HEMOCHROMATOSIS: ICD-10-CM

## 2025-05-19 LAB
ALBUMIN SERPL BCP-MCNC: 4.7 G/DL (ref 3.4–5)
ALP SERPL-CCNC: 48 U/L (ref 33–136)
ALT SERPL W P-5'-P-CCNC: 25 U/L (ref 10–52)
ANION GAP SERPL CALC-SCNC: 11 MMOL/L (ref 10–20)
AST SERPL W P-5'-P-CCNC: 24 U/L (ref 9–39)
BASOPHILS # BLD AUTO: 0.05 X10*3/UL (ref 0–0.1)
BASOPHILS NFR BLD AUTO: 0.9 %
BILIRUB DIRECT SERPL-MCNC: 0.1 MG/DL (ref 0–0.3)
BILIRUB SERPL-MCNC: 0.9 MG/DL (ref 0–1.2)
BUN SERPL-MCNC: 18 MG/DL (ref 6–23)
CALCIUM SERPL-MCNC: 9.5 MG/DL (ref 8.6–10.3)
CHLORIDE SERPL-SCNC: 106 MMOL/L (ref 98–107)
CO2 SERPL-SCNC: 22 MMOL/L (ref 21–32)
CREAT SERPL-MCNC: 1.07 MG/DL (ref 0.5–1.3)
EGFRCR SERPLBLD CKD-EPI 2021: 76 ML/MIN/1.73M*2
EOSINOPHIL # BLD AUTO: 0.08 X10*3/UL (ref 0–0.7)
EOSINOPHIL NFR BLD AUTO: 1.5 %
ERYTHROCYTE [DISTWIDTH] IN BLOOD BY AUTOMATED COUNT: 13.2 % (ref 11.5–14.5)
FERRITIN SERPL-MCNC: 48 NG/ML (ref 20–300)
GLUCOSE SERPL-MCNC: 115 MG/DL (ref 74–99)
HCT VFR BLD AUTO: 47.6 % (ref 41–52)
HGB BLD-MCNC: 16.5 G/DL (ref 13.5–17.5)
IMM GRANULOCYTES # BLD AUTO: 0.04 X10*3/UL (ref 0–0.7)
IMM GRANULOCYTES NFR BLD AUTO: 0.7 % (ref 0–0.9)
IRON SATN MFR SERPL: 29 % (ref 25–45)
IRON SERPL-MCNC: 123 UG/DL (ref 35–150)
LYMPHOCYTES # BLD AUTO: 1.53 X10*3/UL (ref 1.2–4.8)
LYMPHOCYTES NFR BLD AUTO: 27.9 %
MCH RBC QN AUTO: 33.3 PG (ref 26–34)
MCHC RBC AUTO-ENTMCNC: 34.7 G/DL (ref 32–36)
MCV RBC AUTO: 96 FL (ref 80–100)
MONOCYTES # BLD AUTO: 0.4 X10*3/UL (ref 0.1–1)
MONOCYTES NFR BLD AUTO: 7.3 %
NEUTROPHILS # BLD AUTO: 3.39 X10*3/UL (ref 1.2–7.7)
NEUTROPHILS NFR BLD AUTO: 61.7 %
NRBC BLD-RTO: 0 /100 WBCS (ref 0–0)
PLATELET # BLD AUTO: 180 X10*3/UL (ref 150–450)
POTASSIUM SERPL-SCNC: 4.3 MMOL/L (ref 3.5–5.3)
PROT SERPL-MCNC: 6.6 G/DL (ref 6.4–8.2)
RBC # BLD AUTO: 4.96 X10*6/UL (ref 4.5–5.9)
SODIUM SERPL-SCNC: 135 MMOL/L (ref 136–145)
TIBC SERPL-MCNC: 419 UG/DL (ref 240–445)
UIBC SERPL-MCNC: 296 UG/DL (ref 110–370)
WBC # BLD AUTO: 5.5 X10*3/UL (ref 4.4–11.3)

## 2025-05-19 PROCEDURE — 85025 COMPLETE CBC W/AUTO DIFF WBC: CPT

## 2025-05-19 PROCEDURE — 82248 BILIRUBIN DIRECT: CPT

## 2025-05-19 PROCEDURE — 80048 BASIC METABOLIC PNL TOTAL CA: CPT

## 2025-05-19 PROCEDURE — 83550 IRON BINDING TEST: CPT

## 2025-05-19 PROCEDURE — 82728 ASSAY OF FERRITIN: CPT

## 2025-05-19 PROCEDURE — 36415 COLL VENOUS BLD VENIPUNCTURE: CPT

## 2025-05-19 ASSESSMENT — PAIN SCALES - GENERAL: PAINLEVEL_OUTOF10: 0-NO PAIN

## 2025-07-10 ENCOUNTER — APPOINTMENT (OUTPATIENT)
Dept: HEMATOLOGY/ONCOLOGY | Facility: HOSPITAL | Age: 70
End: 2025-07-10
Payer: MEDICARE

## 2025-07-11 ENCOUNTER — INFUSION (OUTPATIENT)
Dept: HEMATOLOGY/ONCOLOGY | Facility: HOSPITAL | Age: 70
End: 2025-07-11
Payer: MEDICARE

## 2025-07-11 VITALS
TEMPERATURE: 96.4 F | DIASTOLIC BLOOD PRESSURE: 71 MMHG | HEART RATE: 71 BPM | RESPIRATION RATE: 16 BRPM | SYSTOLIC BLOOD PRESSURE: 105 MMHG | OXYGEN SATURATION: 95 %

## 2025-07-11 DIAGNOSIS — E83.110 HEREDITARY HEMOCHROMATOSIS: ICD-10-CM

## 2025-07-11 LAB
ALBUMIN SERPL BCP-MCNC: 4.4 G/DL (ref 3.4–5)
ALP SERPL-CCNC: 47 U/L (ref 33–136)
ALT SERPL W P-5'-P-CCNC: 24 U/L (ref 10–52)
ANION GAP SERPL CALC-SCNC: 14 MMOL/L (ref 10–20)
AST SERPL W P-5'-P-CCNC: 22 U/L (ref 9–39)
BASOPHILS # BLD AUTO: 0.06 X10*3/UL (ref 0–0.1)
BASOPHILS NFR BLD AUTO: 1.1 %
BILIRUB DIRECT SERPL-MCNC: 0.1 MG/DL (ref 0–0.3)
BILIRUB SERPL-MCNC: 0.7 MG/DL (ref 0–1.2)
BUN SERPL-MCNC: 19 MG/DL (ref 6–23)
CALCIUM SERPL-MCNC: 9.7 MG/DL (ref 8.6–10.3)
CHLORIDE SERPL-SCNC: 107 MMOL/L (ref 98–107)
CO2 SERPL-SCNC: 22 MMOL/L (ref 21–32)
CREAT SERPL-MCNC: 1.15 MG/DL (ref 0.5–1.3)
EGFRCR SERPLBLD CKD-EPI 2021: 69 ML/MIN/1.73M*2
EOSINOPHIL # BLD AUTO: 0.08 X10*3/UL (ref 0–0.7)
EOSINOPHIL NFR BLD AUTO: 1.4 %
ERYTHROCYTE [DISTWIDTH] IN BLOOD BY AUTOMATED COUNT: 13 % (ref 11.5–14.5)
FERRITIN SERPL-MCNC: 104 NG/ML (ref 20–300)
GLUCOSE SERPL-MCNC: 105 MG/DL (ref 74–99)
HCT VFR BLD AUTO: 47.1 % (ref 41–52)
HGB BLD-MCNC: 15.8 G/DL (ref 13.5–17.5)
IMM GRANULOCYTES # BLD AUTO: 0.03 X10*3/UL (ref 0–0.7)
IMM GRANULOCYTES NFR BLD AUTO: 0.5 % (ref 0–0.9)
IRON SATN MFR SERPL: 44 % (ref 25–45)
IRON SERPL-MCNC: 166 UG/DL (ref 35–150)
LYMPHOCYTES # BLD AUTO: 1.8 X10*3/UL (ref 1.2–4.8)
LYMPHOCYTES NFR BLD AUTO: 32.1 %
MCH RBC QN AUTO: 32.2 PG (ref 26–34)
MCHC RBC AUTO-ENTMCNC: 33.5 G/DL (ref 32–36)
MCV RBC AUTO: 96 FL (ref 80–100)
MONOCYTES # BLD AUTO: 0.48 X10*3/UL (ref 0.1–1)
MONOCYTES NFR BLD AUTO: 8.6 %
NEUTROPHILS # BLD AUTO: 3.15 X10*3/UL (ref 1.2–7.7)
NEUTROPHILS NFR BLD AUTO: 56.3 %
NRBC BLD-RTO: 0 /100 WBCS (ref 0–0)
PLATELET # BLD AUTO: 181 X10*3/UL (ref 150–450)
POTASSIUM SERPL-SCNC: 4.8 MMOL/L (ref 3.5–5.3)
PROT SERPL-MCNC: 6.7 G/DL (ref 6.4–8.2)
RBC # BLD AUTO: 4.91 X10*6/UL (ref 4.5–5.9)
SODIUM SERPL-SCNC: 138 MMOL/L (ref 136–145)
TIBC SERPL-MCNC: 377 UG/DL (ref 240–445)
UIBC SERPL-MCNC: 211 UG/DL (ref 110–370)
WBC # BLD AUTO: 5.6 X10*3/UL (ref 4.4–11.3)

## 2025-07-11 PROCEDURE — 85025 COMPLETE CBC W/AUTO DIFF WBC: CPT | Performed by: NURSE PRACTITIONER

## 2025-07-11 PROCEDURE — 82728 ASSAY OF FERRITIN: CPT | Performed by: NURSE PRACTITIONER

## 2025-07-11 PROCEDURE — 82248 BILIRUBIN DIRECT: CPT

## 2025-07-11 PROCEDURE — 83540 ASSAY OF IRON: CPT

## 2025-07-11 PROCEDURE — 99195 PHLEBOTOMY: CPT

## 2025-07-11 RX ORDER — ALBUTEROL SULFATE 0.83 MG/ML
3 SOLUTION RESPIRATORY (INHALATION) AS NEEDED
OUTPATIENT
Start: 2025-09-04

## 2025-07-11 RX ORDER — EPINEPHRINE 0.3 MG/.3ML
0.3 INJECTION SUBCUTANEOUS EVERY 5 MIN PRN
OUTPATIENT
Start: 2025-09-04

## 2025-07-11 RX ORDER — DIPHENHYDRAMINE HYDROCHLORIDE 50 MG/ML
50 INJECTION, SOLUTION INTRAMUSCULAR; INTRAVENOUS AS NEEDED
OUTPATIENT
Start: 2025-09-04

## 2025-07-11 RX ORDER — FAMOTIDINE 10 MG/ML
20 INJECTION, SOLUTION INTRAVENOUS ONCE AS NEEDED
OUTPATIENT
Start: 2025-09-04

## 2025-07-11 ASSESSMENT — PATIENT HEALTH QUESTIONNAIRE - PHQ9
2. FEELING DOWN, DEPRESSED OR HOPELESS: NOT AT ALL
SUM OF ALL RESPONSES TO PHQ9 QUESTIONS 1 & 2: 0
1. LITTLE INTEREST OR PLEASURE IN DOING THINGS: NOT AT ALL

## 2025-07-11 ASSESSMENT — PAIN SCALES - GENERAL: PAINLEVEL_OUTOF10: 0-NO PAIN

## 2025-07-11 NOTE — PROGRESS NOTES
Therapeutic Phlebotomy Time-Out  Pre procedure Verification    Correct patient identity confirmed by 2 identifiers  Accurate written consent is completed (may be paper/media)  Procedure confirmed and consistent with consent form    Time out time: 0841    Ferritin 104 today, needs to be greater than 50. Consent valid until Dec.

## 2025-07-11 NOTE — PROGRESS NOTES
Scale calibration verified prior to phlebotomy (Scale serial #[id number]). Phlebotomized 500 ml blood without incident. Pt tolerated procedure well. No c/o dizziness or lightheadedness. Oral hydration during and post procedure.

## 2025-08-25 ENCOUNTER — OFFICE VISIT (OUTPATIENT)
Dept: PRIMARY CARE | Facility: CLINIC | Age: 70
End: 2025-08-25
Payer: MEDICARE

## 2025-08-25 VITALS
WEIGHT: 210 LBS | OXYGEN SATURATION: 98 % | SYSTOLIC BLOOD PRESSURE: 118 MMHG | HEART RATE: 73 BPM | BODY MASS INDEX: 30.06 KG/M2 | HEIGHT: 70 IN | DIASTOLIC BLOOD PRESSURE: 80 MMHG

## 2025-08-25 DIAGNOSIS — Z12.11 ENCOUNTER FOR SCREENING FOR MALIGNANT NEOPLASM OF COLON: ICD-10-CM

## 2025-08-25 DIAGNOSIS — R35.1 NOCTURIA: ICD-10-CM

## 2025-08-25 DIAGNOSIS — R35.1 BENIGN PROSTATIC HYPERPLASIA WITH NOCTURIA: ICD-10-CM

## 2025-08-25 DIAGNOSIS — Z12.5 SCREENING FOR PROSTATE CANCER: ICD-10-CM

## 2025-08-25 DIAGNOSIS — E55.9 VITAMIN D DEFICIENCY: ICD-10-CM

## 2025-08-25 DIAGNOSIS — L98.9 SKIN LESIONS: ICD-10-CM

## 2025-08-25 DIAGNOSIS — E78.2 MIXED HYPERLIPIDEMIA: ICD-10-CM

## 2025-08-25 DIAGNOSIS — Z00.00 MEDICARE ANNUAL WELLNESS VISIT, SUBSEQUENT: Primary | ICD-10-CM

## 2025-08-25 DIAGNOSIS — I10 ESSENTIAL HYPERTENSION: ICD-10-CM

## 2025-08-25 DIAGNOSIS — R53.83 FATIGUE, UNSPECIFIED TYPE: ICD-10-CM

## 2025-08-25 DIAGNOSIS — N40.1 BENIGN PROSTATIC HYPERPLASIA WITH NOCTURIA: ICD-10-CM

## 2025-08-25 PROCEDURE — 99397 PER PM REEVAL EST PAT 65+ YR: CPT | Performed by: FAMILY MEDICINE

## 2025-08-25 PROCEDURE — 3079F DIAST BP 80-89 MM HG: CPT | Performed by: FAMILY MEDICINE

## 2025-08-25 PROCEDURE — G0439 PPPS, SUBSEQ VISIT: HCPCS | Performed by: FAMILY MEDICINE

## 2025-08-25 PROCEDURE — 1160F RVW MEDS BY RX/DR IN RCRD: CPT | Performed by: FAMILY MEDICINE

## 2025-08-25 PROCEDURE — 1126F AMNT PAIN NOTED NONE PRSNT: CPT | Performed by: FAMILY MEDICINE

## 2025-08-25 PROCEDURE — 1158F ADVNC CARE PLAN TLK DOCD: CPT | Performed by: FAMILY MEDICINE

## 2025-08-25 PROCEDURE — 1123F ACP DISCUSS/DSCN MKR DOCD: CPT | Performed by: FAMILY MEDICINE

## 2025-08-25 PROCEDURE — 3074F SYST BP LT 130 MM HG: CPT | Performed by: FAMILY MEDICINE

## 2025-08-25 PROCEDURE — 99215 OFFICE O/P EST HI 40 MIN: CPT | Mod: 25 | Performed by: FAMILY MEDICINE

## 2025-08-25 PROCEDURE — 1036F TOBACCO NON-USER: CPT | Performed by: FAMILY MEDICINE

## 2025-08-25 PROCEDURE — 1159F MED LIST DOCD IN RCRD: CPT | Performed by: FAMILY MEDICINE

## 2025-08-25 PROCEDURE — 1170F FXNL STATUS ASSESSED: CPT | Performed by: FAMILY MEDICINE

## 2025-08-25 PROCEDURE — G2211 COMPLEX E/M VISIT ADD ON: HCPCS | Performed by: FAMILY MEDICINE

## 2025-08-25 PROCEDURE — 3008F BODY MASS INDEX DOCD: CPT | Performed by: FAMILY MEDICINE

## 2025-08-25 RX ORDER — LISINOPRIL 20 MG/1
20 TABLET ORAL DAILY
Qty: 90 TABLET | Refills: 3 | Status: SHIPPED | OUTPATIENT
Start: 2025-08-25

## 2025-08-25 ASSESSMENT — ACTIVITIES OF DAILY LIVING (ADL)
USING TELEPHONE: INDEPENDENT
TAKING MEDICATION: INDEPENDENT
FEEDING: INDEPENDENT
DOING HOUSEWORK: INDEPENDENT
USING TRANSPORTATION: INDEPENDENT
STIL DRIVING: YES
GROCERY SHOPPING: INDEPENDENT
TOILETING: INDEPENDENT
MANAGING FINANCES: INDEPENDENT
PREPARING MEALS: INDEPENDENT
ADEQUATE_TO_COMPLETE_ADL: YES
DRESSING: INDEPENDENT
BATHING: INDEPENDENT
PILL BOX USED: YES
JUDGMENT_ADEQUATE_SAFELY_COMPLETE_DAILY_ACTIVITIES: YES
NEEDS ASSISTANCE WITH FOOD: INDEPENDENT

## 2025-08-25 ASSESSMENT — ENCOUNTER SYMPTOMS
HEMATURIA: 0
FEVER: 0
ARTHRALGIAS: 0
WHEEZING: 0
SHORTNESS OF BREATH: 0
CHILLS: 0
FATIGUE: 1
MYALGIAS: 0
NAUSEA: 0
FREQUENCY: 0
DIZZINESS: 0
CONSTIPATION: 0
PALPITATIONS: 0
WEAKNESS: 0
NERVOUS/ANXIOUS: 0
CONFUSION: 0
VOMITING: 0

## 2025-08-25 ASSESSMENT — GERIATRIC MINI NUTRITIONAL ASSESSMENT (MNA)
A HAS FOOD INTAKE DECLINED OVER THE PAST 3 MONTHS DUE TO LOSS OF APPETITE, DIGESTIVE PROBLEMS, CHEWING OR SWALLOWING DIFFICULTIES?: NO DECREASE IN FOOD INTAKE
E NEUROPSYCHOLOGICAL PROBLEMS: NO PSYCHOLOGICAL PROBLEMS
B WEIGHT LOSS DURING THE LAST 3 MONTHS: DOES NOT KNOW

## 2025-08-25 ASSESSMENT — COGNITIVE AND FUNCTIONAL STATUS - GENERAL: VERBAL FLUENCY - ANIMAL NAMES (0 TO 25): 3

## 2025-08-25 ASSESSMENT — PAIN SCALES - GENERAL: PAINLEVEL_OUTOF10: 0-NO PAIN

## 2025-08-27 ENCOUNTER — RESULTS FOLLOW-UP (OUTPATIENT)
Dept: PRIMARY CARE | Facility: CLINIC | Age: 70
End: 2025-08-27
Payer: COMMERCIAL

## 2025-08-27 LAB
25(OH)D3+25(OH)D2 SERPL-MCNC: 45 NG/ML (ref 30–100)
ALBUMIN SERPL-MCNC: 4.7 G/DL (ref 3.6–5.1)
ALP SERPL-CCNC: 45 U/L (ref 35–144)
ALT SERPL-CCNC: 26 U/L (ref 9–46)
ANION GAP SERPL CALCULATED.4IONS-SCNC: 8 MMOL/L (CALC) (ref 7–17)
APPEARANCE UR: CLEAR
AST SERPL-CCNC: 28 U/L (ref 10–35)
BACTERIA #/AREA URNS HPF: ABNORMAL /HPF
BILIRUB SERPL-MCNC: 0.9 MG/DL (ref 0.2–1.2)
BILIRUB UR QL STRIP: NEGATIVE
BUN SERPL-MCNC: 18 MG/DL (ref 7–25)
CALCIUM SERPL-MCNC: 10 MG/DL (ref 8.6–10.3)
CAOX CRY #/AREA URNS HPF: ABNORMAL /HPF
CHLORIDE SERPL-SCNC: 105 MMOL/L (ref 98–110)
CHOLEST SERPL-MCNC: 209 MG/DL
CHOLEST/HDLC SERPL: 4.5 (CALC)
CO2 SERPL-SCNC: 25 MMOL/L (ref 20–32)
COLOR UR: YELLOW
CREAT SERPL-MCNC: 1.15 MG/DL (ref 0.7–1.35)
EGFRCR SERPLBLD CKD-EPI 2021: 69 ML/MIN/1.73M2
GLUCOSE SERPL-MCNC: 89 MG/DL (ref 65–139)
GLUCOSE UR QL STRIP: NEGATIVE
HDLC SERPL-MCNC: 46 MG/DL
HGB UR QL STRIP: NEGATIVE
HYALINE CASTS #/AREA URNS LPF: ABNORMAL /LPF
KETONES UR QL STRIP: NEGATIVE
LDLC SERPL CALC-MCNC: 116 MG/DL (CALC)
LEUKOCYTE ESTERASE UR QL STRIP: ABNORMAL
NITRITE UR QL STRIP: NEGATIVE
NONHDLC SERPL-MCNC: 163 MG/DL (CALC)
PH UR STRIP: 6 [PH] (ref 5–8)
POTASSIUM SERPL-SCNC: 4.9 MMOL/L (ref 3.5–5.3)
PROT SERPL-MCNC: 7.3 G/DL (ref 6.1–8.1)
PROT UR QL STRIP: NEGATIVE
PSA SERPL-MCNC: 1.01 NG/ML
RBC #/AREA URNS HPF: ABNORMAL /HPF
SERVICE CMNT-IMP: ABNORMAL
SODIUM SERPL-SCNC: 138 MMOL/L (ref 135–146)
SP GR UR STRIP: 1.02 (ref 1–1.03)
SQUAMOUS #/AREA URNS HPF: ABNORMAL /HPF
TRIGL SERPL-MCNC: 326 MG/DL
TSH SERPL-ACNC: 0.64 MIU/L (ref 0.4–4.5)
WBC #/AREA URNS HPF: ABNORMAL /HPF

## (undated) DEVICE — PREP, SKIN, BETADINE SOLUTION, 1/2 OZ

## (undated) DEVICE — Device

## (undated) DEVICE — SUTURE, PROLENE 4-0, 18IN, PS2, BLUE MONO

## (undated) DEVICE — SPONGE, GAUZE, 12 PLY, 4 X 4, STERILE, DISP

## (undated) DEVICE — APPLICATOR, COTTON TIP, 6 IN, 10PK, STERILE

## (undated) DEVICE — BANDAGE, GAUZE, 6 PLY, KERLIX, 4.5 IN X 4.1 YD, AMD, STERILE

## (undated) DEVICE — GLOVE, SURGICAL, PROTEXIS PI , 8.0, PF, LF

## (undated) DEVICE — GLOVE, SURGICAL, PROTEXIS PI BLUE W/NEUTHERA, 8.5, PF, LF

## (undated) DEVICE — DRESSING, NON-ADHERENT, 3 X 3 IN, STERILE

## (undated) DEVICE — BLADE, SURGICAL, POLYMER COATED P15, STERILE, DISP